# Patient Record
Sex: FEMALE | Race: WHITE | NOT HISPANIC OR LATINO | Employment: UNEMPLOYED | URBAN - METROPOLITAN AREA
[De-identification: names, ages, dates, MRNs, and addresses within clinical notes are randomized per-mention and may not be internally consistent; named-entity substitution may affect disease eponyms.]

---

## 2020-05-13 ENCOUNTER — TELEPHONE (OUTPATIENT)
Dept: OBGYN | Age: 25
End: 2020-05-13

## 2020-05-13 DIAGNOSIS — Z34.01 ENCOUNTER FOR PRENATAL CARE IN FIRST TRIMESTER OF FIRST PREGNANCY: Primary | ICD-10-CM

## 2020-06-12 ENCOUNTER — LAB SERVICES (OUTPATIENT)
Dept: LAB | Age: 25
End: 2020-06-12

## 2020-06-12 ENCOUNTER — FIRST OB VISIT (OUTPATIENT)
Dept: OBGYN | Age: 25
End: 2020-06-12

## 2020-06-12 VITALS
WEIGHT: 129.4 LBS | SYSTOLIC BLOOD PRESSURE: 98 MMHG | HEIGHT: 64 IN | DIASTOLIC BLOOD PRESSURE: 62 MMHG | BODY MASS INDEX: 22.09 KG/M2 | HEART RATE: 72 BPM

## 2020-06-12 DIAGNOSIS — Z34.01 ENCOUNTER FOR SUPERVISION OF NORMAL FIRST PREGNANCY IN FIRST TRIMESTER: Primary | ICD-10-CM

## 2020-06-12 DIAGNOSIS — Z34.01 ENCOUNTER FOR SUPERVISION OF NORMAL FIRST PREGNANCY IN FIRST TRIMESTER: ICD-10-CM

## 2020-06-12 DIAGNOSIS — Z12.4 SCREENING FOR CERVICAL CANCER: ICD-10-CM

## 2020-06-12 DIAGNOSIS — Z11.3 SCREENING FOR STDS (SEXUALLY TRANSMITTED DISEASES): ICD-10-CM

## 2020-06-12 LAB
ABO + RH BLD: NORMAL
APPEARANCE UR: CLEAR
BILIRUB UR QL STRIP: NEGATIVE
BLD GP AB SCN SERPL QL GEL: NEGATIVE
CERVICAL LENGTH: 3.58 CM
COLOR UR: YELLOW
CRL: 29.2 MM
DEPRECATED RDW RBC: 40.8 FL (ref 39–50)
DEPTH (R): 2.83 CM
EDD: NORMAL
ERYTHROCYTE [DISTWIDTH] IN BLOOD: 12.7 % (ref 11–15)
EXTERNAL SYPHILIS RPR SCREEN: NORMAL
GA: NORMAL WEEKS/DAYS
GLUCOSE UR STRIP-MCNC: NEGATIVE MG/DL
GS: 46.4 MM
HBV SURFACE AG SER QL: NEGATIVE
HCT VFR BLD CALC: 41.9 % (ref 36–46.5)
HGB BLD-MCNC: 14.7 G/DL (ref 12–15.5)
HGB UR QL STRIP: NEGATIVE
HR: 167 BPM
KETONES UR STRIP-MCNC: NEGATIVE MG/DL
LENGTH (L): 2.96 CM
LENGTH (R): 3.37 CM
LEUKOCYTE ESTERASE UR QL STRIP: NEGATIVE
MCH RBC QN AUTO: 31.3 PG (ref 26–34)
MCHC RBC AUTO-ENTMCNC: 35.1 G/DL (ref 32–36.5)
MCV RBC AUTO: 89.1 FL (ref 78–100)
NITRITE UR QL STRIP: NEGATIVE
PH UR STRIP: 6 [PH] (ref 5–7)
PLATELET # BLD AUTO: 254 K/MCL (ref 140–450)
PROT UR STRIP-MCNC: NEGATIVE MG/DL
RBC # BLD: 4.7 MIL/MCL (ref 4–5.2)
RUBV IGG SERPL IA-ACNC: 48 UNITS/ML
SP GR UR STRIP: 1.01 (ref 1–1.03)
UROBILINOGEN UR STRIP-MCNC: 0.2 MG/DL
UTERINE DEPTH: 8.5 CM
UTERINE LENGTH: 8.51 CM
UTERINE WIDTH: 5.59 CM
WBC # BLD: 8.2 K/MCL (ref 4.2–11)
WIDTH (L): 1.96 CM
WIDTH (R): 1.37 CM

## 2020-06-12 PROCEDURE — 87591 N.GONORRHOEAE DNA AMP PROB: CPT | Performed by: INTERNAL MEDICINE

## 2020-06-12 PROCEDURE — 87086 URINE CULTURE/COLONY COUNT: CPT | Performed by: INTERNAL MEDICINE

## 2020-06-12 PROCEDURE — 99204 OFFICE O/P NEW MOD 45 MIN: CPT | Performed by: OBSTETRICS & GYNECOLOGY

## 2020-06-12 PROCEDURE — 86762 RUBELLA ANTIBODY: CPT | Performed by: INTERNAL MEDICINE

## 2020-06-12 PROCEDURE — 87340 HEPATITIS B SURFACE AG IA: CPT | Performed by: INTERNAL MEDICINE

## 2020-06-12 PROCEDURE — 86900 BLOOD TYPING SEROLOGIC ABO: CPT | Performed by: INTERNAL MEDICINE

## 2020-06-12 PROCEDURE — 86780 TREPONEMA PALLIDUM: CPT | Performed by: INTERNAL MEDICINE

## 2020-06-12 PROCEDURE — 86901 BLOOD TYPING SEROLOGIC RH(D): CPT | Performed by: INTERNAL MEDICINE

## 2020-06-12 PROCEDURE — 86850 RBC ANTIBODY SCREEN: CPT | Performed by: INTERNAL MEDICINE

## 2020-06-12 PROCEDURE — 81003 URINALYSIS AUTO W/O SCOPE: CPT | Performed by: INTERNAL MEDICINE

## 2020-06-12 PROCEDURE — 76817 TRANSVAGINAL US OBSTETRIC: CPT | Performed by: OBSTETRICS & GYNECOLOGY

## 2020-06-12 PROCEDURE — 85027 COMPLETE CBC AUTOMATED: CPT | Performed by: INTERNAL MEDICINE

## 2020-06-12 PROCEDURE — 88175 CYTOPATH C/V AUTO FLUID REDO: CPT | Performed by: PATHOLOGY

## 2020-06-12 PROCEDURE — 87389 HIV-1 AG W/HIV-1&-2 AB AG IA: CPT | Performed by: INTERNAL MEDICINE

## 2020-06-12 PROCEDURE — 87491 CHLMYD TRACH DNA AMP PROBE: CPT | Performed by: INTERNAL MEDICINE

## 2020-06-12 ASSESSMENT — EDINBURGH POSTNATAL DEPRESSION SCALE (EPDS)
I HAVE BEEN SO UNHAPPY THAT I HAVE HAD DIFFICULTY SLEEPING: NOT AT ALL
I HAVE BEEN SO UNHAPPY THAT I HAVE BEEN CRYING: NO, NEVER
I HAVE BEEN ABLE TO LAUGH AND SEE THE FUNNY SIDE OF THINGS: AS MUCH AS I ALWAYS COULD
I HAVE LOOKED FORWARD WITH ENJOYMENT TO THINGS: AS MUCH AS I EVER DID
I HAVE FELT SCARED OR PANICKY FOR NO GOOD REASON: NO, NOT AT ALL
I HAVE BLAMED MYSELF UNNECESSARILY WHEN THINGS WENT WRONG: NO, NEVER
THINGS HAVE BEEN GETTING ON TOP OF ME: NO, I HAVE BEEN COPING AS WELL AS EVER
THE THOUGHT OF HARMING MYSELF HAS OCCURRED TO ME: NEVER
TOTAL SCORE: 1
I HAVE FELT SAD OR MISERABLE: NO, NOT AT ALL
I HAVE BEEN ANXIOUS OR WORRIED FOR NO GOOD REASON: HARDLY EVER

## 2020-06-13 LAB
BACTERIA UR CULT: NORMAL
HIV 1+2 AB+HIV1 P24 AG SERPL QL IA: NONREACTIVE
T PALLIDUM IGG SER QL IA: NONREACTIVE

## 2020-06-15 LAB
C TRACH RRNA SPEC QL NAA+PROBE: NEGATIVE
Lab: NORMAL
N GONORRHOEA RRNA SPEC QL NAA+PROBE: NEGATIVE

## 2020-06-17 LAB
CASE RPRT: NORMAL
CYTOLOGY CVX/VAG STUDY: NORMAL
PAP EDUCATIONAL NOTE: NORMAL
SPECIMEN ADEQUACY: NORMAL

## 2020-06-18 ENCOUNTER — TELEPHONE (OUTPATIENT)
Dept: OBGYN | Age: 25
End: 2020-06-18

## 2020-07-06 ENCOUNTER — OB CHECK (OUTPATIENT)
Dept: OBGYN | Age: 25
End: 2020-07-06

## 2020-07-06 VITALS
WEIGHT: 129.4 LBS | HEART RATE: 78 BPM | DIASTOLIC BLOOD PRESSURE: 68 MMHG | SYSTOLIC BLOOD PRESSURE: 102 MMHG | BODY MASS INDEX: 22.21 KG/M2

## 2020-07-06 DIAGNOSIS — Z34.02 ENCOUNTER FOR SUPERVISION OF NORMAL FIRST PREGNANCY IN SECOND TRIMESTER: Primary | ICD-10-CM

## 2020-07-06 PROCEDURE — 99212 OFFICE O/P EST SF 10 MIN: CPT | Performed by: OBSTETRICS & GYNECOLOGY

## 2020-07-07 ENCOUNTER — TELEPHONE (OUTPATIENT)
Dept: OBGYN | Age: 25
End: 2020-07-07

## 2020-07-08 LAB — HOLD SPECIMEN: NORMAL

## 2020-12-17 LAB — EXTERNAL GROUP B STREP DNA: NEGATIVE

## 2020-12-28 ENCOUNTER — TELEPHONE (OUTPATIENT)
Dept: OBSTETRICS AND GYNECOLOGY | Facility: HOSPITAL | Age: 25
End: 2020-12-28

## 2020-12-29 ENCOUNTER — HOSPITAL ENCOUNTER (OUTPATIENT)
Facility: HOSPITAL | Age: 25
Discharge: HOME | End: 2020-12-29
Attending: OBSTETRICS & GYNECOLOGY | Admitting: OBSTETRICS & GYNECOLOGY
Payer: COMMERCIAL

## 2020-12-29 ENCOUNTER — ANESTHESIA EVENT (OUTPATIENT)
Dept: OBSTETRICS AND GYNECOLOGY | Facility: HOSPITAL | Age: 25
End: 2020-12-29
Payer: COMMERCIAL

## 2020-12-29 ENCOUNTER — APPOINTMENT (OUTPATIENT)
Dept: LABOR AND DELIVERY | Facility: HOSPITAL | Age: 25
End: 2020-12-29
Payer: COMMERCIAL

## 2020-12-29 ENCOUNTER — ANESTHESIA (OUTPATIENT)
Dept: OBSTETRICS AND GYNECOLOGY | Facility: HOSPITAL | Age: 25
End: 2020-12-29
Payer: COMMERCIAL

## 2020-12-29 VITALS
HEIGHT: 64 IN | HEART RATE: 72 BPM | DIASTOLIC BLOOD PRESSURE: 61 MMHG | WEIGHT: 158.95 LBS | RESPIRATION RATE: 18 BRPM | BODY MASS INDEX: 27.14 KG/M2 | OXYGEN SATURATION: 100 % | SYSTOLIC BLOOD PRESSURE: 105 MMHG | TEMPERATURE: 97.9 F

## 2020-12-29 LAB
PLATELET # BLD AUTO: 249 10*3/UL (ref 140–350)
SARS-COV-2 RNA RESP QL NAA+PROBE: NEGATIVE

## 2020-12-29 PROCEDURE — 87635 SARS-COV-2 COVID-19 AMP PRB: CPT | Performed by: OBSTETRICS & GYNECOLOGY

## 2020-12-29 PROCEDURE — G0463 HOSPITAL OUTPT CLINIC VISIT: HCPCS

## 2020-12-29 PROCEDURE — 85049 AUTOMATED PLATELET COUNT: CPT | Performed by: OBSTETRICS & GYNECOLOGY

## 2020-12-29 PROCEDURE — C1755 CATHETER, INTRASPINAL: HCPCS | Performed by: ANESTHESIOLOGY

## 2020-12-29 PROCEDURE — C9803 HOPD COVID-19 SPEC COLLECT: HCPCS | Performed by: OBSTETRICS & GYNECOLOGY

## 2020-12-29 PROCEDURE — 6360000200 HC RX 636 W HCPCS (ALT 250 FOR IP): Performed by: OBSTETRICS & GYNECOLOGY

## 2020-12-29 PROCEDURE — C1755 CATHETER, INTRASPINAL: HCPCS | Performed by: NURSE ANESTHETIST, CERTIFIED REGISTERED

## 2020-12-29 PROCEDURE — 96372 THER/PROPH/DIAG INJ SC/IM: CPT

## 2020-12-29 PROCEDURE — 2500000200 HC RX 250 WO HCPCS: Performed by: ANESTHESIOLOGY

## 2020-12-29 PROCEDURE — 62326 NJX INTERLAMINAR LMBR/SAC: CPT

## 2020-12-29 PROCEDURE — 6360000200 HC RX 636 W HCPCS (ALT 250 FOR IP): Performed by: ANESTHESIOLOGY

## 2020-12-29 PROCEDURE — 2580000300 HC RX 258: Performed by: ANESTHESIOLOGY

## 2020-12-29 PROCEDURE — 96374 THER/PROPH/DIAG INJ IV PUSH: CPT

## 2020-12-29 RX ORDER — LIDOCAINE HYDROCHLORIDE AND EPINEPHRINE 15; 5 MG/ML; UG/ML
INJECTION, SOLUTION EPIDURAL AS NEEDED
Status: DISCONTINUED | OUTPATIENT
Start: 2020-12-29 | End: 2021-01-04 | Stop reason: SURG

## 2020-12-29 RX ORDER — FENTANYL CITRATE/PF 50 MCG/ML
PLASTIC BAG, INJECTION (ML) INTRAVENOUS AS NEEDED
Status: DISCONTINUED | OUTPATIENT
Start: 2020-12-29 | End: 2021-01-04 | Stop reason: SURG

## 2020-12-29 RX ORDER — DIPHENHYDRAMINE HYDROCHLORIDE 50 MG/ML
25 INJECTION INTRAMUSCULAR; INTRAVENOUS EVERY 6 HOURS PRN
Status: DISCONTINUED | OUTPATIENT
Start: 2020-12-29 | End: 2020-12-29 | Stop reason: HOSPADM

## 2020-12-29 RX ORDER — FENTANYL/ROPIVACAINE/NS/PF 2MCG/ML-.2
PLASTIC BAG, INJECTION (ML) INJECTION CONTINUOUS
Status: DISCONTINUED | OUTPATIENT
Start: 2020-12-29 | End: 2020-12-29 | Stop reason: HOSPADM

## 2020-12-29 RX ORDER — SODIUM CHLORIDE, SODIUM LACTATE, POTASSIUM CHLORIDE, AND CALCIUM CHLORIDE .6; .31; .03; .02 G/100ML; G/100ML; G/100ML; G/100ML
1000 INJECTION, SOLUTION INTRAVENOUS ONCE
Status: COMPLETED | OUTPATIENT
Start: 2020-12-29 | End: 2020-12-29

## 2020-12-29 RX ORDER — TERBUTALINE SULFATE 1 MG/ML
0.25 INJECTION SUBCUTANEOUS ONCE
Status: COMPLETED | OUTPATIENT
Start: 2020-12-29 | End: 2020-12-29

## 2020-12-29 RX ORDER — FOLIC ACID/MULTIVIT,IRON,MINER 0.4MG-18MG
1 TABLET ORAL DAILY
COMMUNITY

## 2020-12-29 RX ORDER — FENTANYL CITRATE/PF 50 MCG/ML
100 PLASTIC BAG, INJECTION (ML) INTRAVENOUS ONCE
Status: COMPLETED | OUTPATIENT
Start: 2020-12-29 | End: 2020-12-29

## 2020-12-29 RX ORDER — ONDANSETRON HYDROCHLORIDE 2 MG/ML
4 INJECTION, SOLUTION INTRAVENOUS ONCE
Status: COMPLETED | OUTPATIENT
Start: 2020-12-29 | End: 2020-12-29

## 2020-12-29 RX ORDER — NALOXONE HYDROCHLORIDE 0.4 MG/ML
0.2 INJECTION, SOLUTION INTRAMUSCULAR; INTRAVENOUS; SUBCUTANEOUS AS NEEDED
Status: DISCONTINUED | OUTPATIENT
Start: 2020-12-29 | End: 2020-12-29 | Stop reason: HOSPADM

## 2020-12-29 RX ADMIN — FENTANYL CITRATE 100 MCG: 50 INJECTION INTRAMUSCULAR; INTRAVENOUS at 08:16

## 2020-12-29 RX ADMIN — FENTANYL CITRATE 100 MCG: 50 INJECTION, SOLUTION INTRAMUSCULAR; INTRAVENOUS at 07:35

## 2020-12-29 RX ADMIN — Medication: at 08:23

## 2020-12-29 RX ADMIN — LIDOCAINE HYDROCHLORIDE AND EPINEPHRINE 5 ML: 15; 5 INJECTION, SOLUTION EPIDURAL at 08:18

## 2020-12-29 RX ADMIN — ONDANSETRON 4 MG: 2 INJECTION INTRAMUSCULAR; INTRAVENOUS at 07:35

## 2020-12-29 RX ADMIN — TERBUTALINE SULFATE 0.25 MG: 1 INJECTION SUBCUTANEOUS at 08:51

## 2020-12-29 RX ADMIN — SODIUM CHLORIDE, POTASSIUM CHLORIDE, SODIUM LACTATE AND CALCIUM CHLORIDE 1000 ML: 600; 310; 30; 20 INJECTION, SOLUTION INTRAVENOUS at 08:25

## 2020-12-29 NOTE — H&P
History and Physical    Chief complaint: Version    HPI  Magy Marin is a 25 y.o. female with an Estimated Date of Delivery: 21. She is currently a 38w4d  who is being admitted for External version. Her current pregnancy is significant for breech presentation. Patient reports no complaints. Fetal Movement: normal.     OB History    Para Term  AB Living   1             SAB TAB Ectopic Molar Multiple Live Births                    # Outcome Date GA Lbr Jeff/2nd Weight Sex Delivery Anes PTL Lv   1 Current              History reviewed. No pertinent past medical history.  History reviewed. No pertinent surgical history.    History reviewed. No pertinent family history.  No Known Allergies  Medications Prior to Admission   Medication Sig   • MVP-ferrous fumarate-FA (Prenatal) 28 mg iron- 800 mcg tablet Take 1 tablet by mouth daily       Review of Systems:  Pertinent items are noted in HPI.    Objective     Vital signs:  Vitals:    20 0845   BP: 108/75   Pulse: 68   Resp:    Temp:    SpO2: 100%       Maternal Type & Screen     Maternal Type & Screen (External Results)     Test Value Date Time    ABO (External Results) B  20     Rh (External Results) Positive  20     Antibody screen (External Results) Negative  20             Prenatal Results     Maternal Prenatal Labs     Test Value Date Time    Gonorrhea  Negative  20     Chlamydia  Negative  20     Syphilis Ab Non-reactive  20     RPR        Rubella Reactive-Presumed Immune  20     HBsAg  Nonreactive  20     HIV Nonreactive  20     GBS       GBS Report                 Physical Exam:  General:   Alert, oriented, no acute distress   Skin: Normal   Lungs:  Clear to auscultation bilaterally   Heart:  Regular rate and rhythm   Abdomen:  Gravid, non-tender   Extremities:  No edema, non-tender   Pelvis/Cervix:  deferred   Uterine Size:  size equals dates US confirms transverse lie, head on  maternal left     EFM: Low baseline. 100s with good variability  Delevan: acontractile    Assessment/Plan      Active Problems:  No Active Problems: There are no active problems currently on the Problem List. Please update the Problem List and refresh.    38 4/7 weeks. Breech/transverse lie. Plan is for attempted external version. Pt consented about further management pending outcome. Consented for emergent section if necessity dictates.  20 minutes  _____________________________  Irene Louie MD  12/29/20 9:12 AM

## 2020-12-29 NOTE — ANESTHESIA PROCEDURE NOTES
Epidural Block    Patient location during procedure: OB  Start time: 12/29/2020 8:17 AM  End time: 12/29/2020 8:17 AM    Staffing  Anesthesiologist: YURY Thakur MD  Performed: anesthesiologist   Preanesthetic Checklist  Completed: patient identified, IV checked, site marked, risks and benefits discussed, surgical consent, monitors and equipment checked, pre-op evaluation and timeout performed  Epidural  Patient position: sitting  Prep: Betadine  Patient monitoring: blood pressure monitoring and continuous pulse oximetry  Approach: midline  Location: L3-L4  Injection technique: DARIUSZ saline  Procedure: negative aspiration for heme,  no paresthesia on injection and negative aspiration for CSF  Local infiltration: lidocaine 1%  Needle  Needle type: Tuohy   Needle gauge: 17 G  Needle insertion depth: 5 cm  Catheter type: side hole  Catheter size: 20 G  Catheter at skin depth: 11 cm  Test dose: negative and lidocaine 1.5% with epinephrine 1-to-200,000  Test dose volume: 3 mL  Catheter secured with: tape and Neoderm    Assessment  Sensory level: T10  Events: well tolerated

## 2020-12-29 NOTE — ANESTHESIA PREPROCEDURE EVALUATION
"Pre-Procedure Assessment    Patient: Magy Marin, female, 25 y.o.    Ht Readings from Last 1 Encounters:   12/29/20 1.626 m (5' 4\")     Wt Readings from Last 1 Encounters:   12/29/20 72.1 kg (158 lb 15.2 oz)       Last Vitals  /78 (12/29/20 0708)    Temp 36.6 °C (97.9 °F) (12/29/20 0708)    Pulse 109 (12/29/20 0710)   Resp 18 (12/29/20 0708)    SpO2 100 % (12/29/20 0710)    Pain Score 0 - No pain (12/29/20 0708)       Problem list reviewed and Medical history reviewed           Airway   Mallampati: II  TM distance: >3 FB  Neck ROM: full      Dental      Pulmonary - negative ROS    breath sounds clear to auscultation  Cardiovascular - negative ROS    Rhythm: regular  Rate: normal    Mental Status/Neuro/Psych - negative ROS   Pt is alert.        GI/Hepatic/Renal - negative ROS     Endo/Other - negative ROS   Abdominal           Social History     Tobacco Use   • Smoking status: Never Smoker   • Smokeless tobacco: Never Used   Substance Use Topics   • Alcohol use: Not Currently      Hematology   Platelets   Date Value Ref Range Status   12/29/2020 249 140 - 350 10*3/uL Final      Coagulation No results found for: PT, APTT, INR   General Chemistry No results found for: POCGLU, CALCIUM, BUN, CREATININE, GLUCOSE, NA, K, MG, CO2, CL, DIGOXIN  Anesthesia Plan    ASA 2   NPO status reviewed: N/A    Spinal                          Anesthetic plan and risks discussed with patient.  Use of blood products discussed with patient who consented to blood products.             "

## 2020-12-29 NOTE — ANESTHESIA PROCEDURE NOTES
Epidural Block    Patient location during procedure: OB  Start time: 12/29/2020 8:06 AM  End time: 12/29/2020 8:16 AM    Staffing  Anesthesiologist: YURY Thakur MD  Performed: anesthesiologist   Preanesthetic Checklist  Completed: patient identified, IV checked, site marked, risks and benefits discussed, surgical consent, monitors and equipment checked, pre-op evaluation and timeout performed  Epidural  Patient position: sitting  Prep: Betadine  Patient monitoring: blood pressure monitoring and continuous pulse oximetry  Approach: midline  Location: L3-L4  Injection technique: DARIUSZ saline  Procedure: negative aspiration for heme,  no paresthesia on injection and negative aspiration for CSF  Local infiltration: lidocaine 1%  Needle  Needle type: Tuohy   Needle gauge: 17 G  Needle insertion depth: 5 cm  Catheter type: side hole  Catheter size: 20 G  Catheter at skin depth: 11 cm  Test dose: negative and lidocaine 1.5% with epinephrine 1-to-200,000  Test dose volume: 3 mL  Catheter secured with: tape and Neoderm    Assessment  Sensory level: T10  Events: well tolerated

## 2020-12-29 NOTE — OP NOTE
Brief Op Note:    Magy Marin  1/4/[birth year]21    * No Diagnosis Codes entered *Breecn       * No Diagnosis Codes entered *    * No procedures documented on diagnosis form *Attempted external version    Surgeon(s):  Irene Louie MD    Asst: Margaret Birmingham, PGY3      Anesthesia:  Anesthesiologist: (Unknown)  CRNA: (Unknown)  Spinal      Pain Block:  Not requested    Staff:   Relief Circulator: (Unknown)  Scrub Person: (Unknown)    Estimated Blood Loss:  No blood loss documented.    Specimens:  No specimens collected during this procedure.      Complications:  None    Procedure: After epidural anesthesia placed, pt consented for external version. US confirms transverse lie, head on maternal left. Terb given prior to attempting version. Attempt to roll baby's head into pelvis attempted but unsuccessful. Pt had some bruising on abdomen as a result of the attempt. Pt appropriately counseled for primary section for breech the following week, labor precautions reviewed. Baby tolerated procedure well. Pt to remain for minimum of two hours for extended monitoring. Epidural turned off at completion of procedure.  Irene Louie MD  Phone Number: [phone]    Date: 12/29/2020  Time: 9:15 AM

## 2020-12-30 NOTE — PRE-PROCEDURE INSTRUCTIONS
Pre-Surgery Instructions:   Medication Instructions   • MVP-ferrous fumarate-FA (Prenatal) 28 mg iron- 800 mcg tablet Do not take morning of surgery/procedure

## 2021-01-04 ENCOUNTER — HOSPITAL ENCOUNTER (INPATIENT)
Facility: HOSPITAL | Age: 26
LOS: 2 days | Discharge: HOME | End: 2021-01-06
Attending: OBSTETRICS & GYNECOLOGY | Admitting: OBSTETRICS & GYNECOLOGY
Payer: COMMERCIAL

## 2021-01-04 LAB
ABO GROUP (TYPE) IN BLOOD: NORMAL
ANTIBODY SCREEN: NORMAL
D AG BLD QL: NORMAL
ERYTHROCYTE [DISTWIDTH] IN BLOOD BY AUTOMATED COUNT: 15.1 % (ref 11.5–14)
HCT VFR BLD AUTO: 32.5 % (ref 34–45)
HGB BLD-MCNC: 11 G/DL (ref 11.5–15.5)
MCH RBC QN AUTO: 28 PG (ref 28–33)
MCHC RBC AUTO-ENTMCNC: 34 G/DL (ref 32–36)
MCV RBC AUTO: 82.4 FL (ref 81–97)
PLATELET # BLD AUTO: 235 10*3/UL (ref 140–350)
PMV BLD AUTO: 9.6 FL (ref 6.9–10.8)
RBC # BLD AUTO: 3.94 10*6/ΜL (ref 3.7–5.3)
SECOND/CONFIRMATORY ABORH PERFORMED: NORMAL
T PALLIDUM IGG SER QL IA: NORMAL
WBC # BLD AUTO: 10.4 10*3/UL (ref 4.5–10.5)

## 2021-01-04 PROCEDURE — 85027 COMPLETE CBC AUTOMATED: CPT | Performed by: OBSTETRICS & GYNECOLOGY

## 2021-01-04 PROCEDURE — (BLANK) HC RECOVERY PHASE-1 OB 1ST HOUR ACUITY LEVEL 1: Performed by: OBSTETRICS & GYNECOLOGY

## 2021-01-04 PROCEDURE — (BLANK) HC REGIONAL ANESTHESIA FACILITY CHARGE 1ST 15 MIN: Performed by: OBSTETRICS & GYNECOLOGY

## 2021-01-04 PROCEDURE — (BLANK) HC ROOM PRIVATE OBSTETRICS

## 2021-01-04 PROCEDURE — 6370000100 HC RX 637 (ALT 250 FOR IP): Performed by: ANESTHESIOLOGY

## 2021-01-04 PROCEDURE — 2580000300 HC RX 258: Performed by: OBSTETRICS & GYNECOLOGY

## 2021-01-04 PROCEDURE — 6360000200 HC RX 636 W HCPCS (ALT 250 FOR IP): Performed by: ANESTHESIOLOGY

## 2021-01-04 PROCEDURE — 2580000300 HC RX 258: Performed by: ANESTHESIOLOGY

## 2021-01-04 PROCEDURE — 86885 COOMBS TEST INDIRECT QUAL: CPT

## 2021-01-04 PROCEDURE — 2590000100 HC RX 259: Performed by: OBSTETRICS & GYNECOLOGY

## 2021-01-04 PROCEDURE — 6360000200 HC RX 636 W HCPCS (ALT 250 FOR IP)

## 2021-01-04 PROCEDURE — 59514 CESAREAN DELIVERY ONLY: CPT | Performed by: OBSTETRICS & GYNECOLOGY

## 2021-01-04 PROCEDURE — S0028 INJECTION, FAMOTIDINE, 20 MG: HCPCS | Performed by: ANESTHESIOLOGY

## 2021-01-04 PROCEDURE — (BLANK) HC RECOVERY PHASE-1 OB EACH ADDITIONAL 1/2  HOUR ACUITY LEVEL 1: Performed by: OBSTETRICS & GYNECOLOGY

## 2021-01-04 PROCEDURE — 2580000300 HC RX 258

## 2021-01-04 PROCEDURE — (BLANK) HC REGIONAL ANESTHESIA FACILITY CHARGE EACH ADDITIONAL MIN: Performed by: OBSTETRICS & GYNECOLOGY

## 2021-01-04 PROCEDURE — 86780 TREPONEMA PALLIDUM: CPT | Performed by: OBSTETRICS & GYNECOLOGY

## 2021-01-04 PROCEDURE — 6360000200 HC RX 636 W HCPCS (ALT 250 FOR IP): Performed by: OBSTETRICS & GYNECOLOGY

## 2021-01-04 RX ORDER — DIPHENHYDRAMINE HYDROCHLORIDE 50 MG/ML
25-50 INJECTION INTRAMUSCULAR; INTRAVENOUS EVERY 6 HOURS PRN
Status: DISCONTINUED | OUTPATIENT
Start: 2021-01-05 | End: 2021-01-06 | Stop reason: HOSPADM

## 2021-01-04 RX ORDER — KETOROLAC TROMETHAMINE 30 MG/ML
30 INJECTION, SOLUTION INTRAMUSCULAR; INTRAVENOUS EVERY 8 HOURS
Status: COMPLETED | OUTPATIENT
Start: 2021-01-04 | End: 2021-01-05

## 2021-01-04 RX ORDER — DIPHENHYDRAMINE HCL 25 MG
25-50 CAPSULE ORAL EVERY 6 HOURS PRN
Status: DISCONTINUED | OUTPATIENT
Start: 2021-01-04 | End: 2021-01-06 | Stop reason: HOSPADM

## 2021-01-04 RX ORDER — SODIUM CITRATE AND CITRIC ACID MONOHYDRATE 334; 500 MG/5ML; MG/5ML
30 SOLUTION ORAL ONCE
Status: COMPLETED | OUTPATIENT
Start: 2021-01-04 | End: 2021-01-04

## 2021-01-04 RX ORDER — HYDROCODONE BITARTRATE AND ACETAMINOPHEN 5; 325 MG/1; MG/1
2 TABLET ORAL ONCE AS NEEDED
Status: DISCONTINUED | OUTPATIENT
Start: 2021-01-04 | End: 2021-01-06 | Stop reason: HOSPADM

## 2021-01-04 RX ORDER — FAMOTIDINE 10 MG/ML
20 INJECTION INTRAVENOUS ONCE
Status: COMPLETED | OUTPATIENT
Start: 2021-01-04 | End: 2021-01-04

## 2021-01-04 RX ORDER — ONDANSETRON HYDROCHLORIDE 2 MG/ML
4 INJECTION, SOLUTION INTRAVENOUS EVERY 6 HOURS PRN
Status: DISCONTINUED | OUTPATIENT
Start: 2021-01-05 | End: 2021-01-06 | Stop reason: HOSPADM

## 2021-01-04 RX ORDER — SODIUM CHLORIDE, SODIUM LACTATE, POTASSIUM CHLORIDE, CALCIUM CHLORIDE 600; 310; 30; 20 MG/100ML; MG/100ML; MG/100ML; MG/100ML
125 INJECTION, SOLUTION INTRAVENOUS CONTINUOUS
Status: DISCONTINUED | OUTPATIENT
Start: 2021-01-04 | End: 2021-01-06 | Stop reason: HOSPADM

## 2021-01-04 RX ORDER — DOCUSATE SODIUM 100 MG/1
100 CAPSULE, LIQUID FILLED ORAL 2 TIMES DAILY
Status: DISCONTINUED | OUTPATIENT
Start: 2021-01-04 | End: 2021-01-06 | Stop reason: HOSPADM

## 2021-01-04 RX ORDER — AMMONIA 15 % (W/V)
1 AMPUL (EA) INHALATION AS NEEDED
Status: DISCONTINUED | OUTPATIENT
Start: 2021-01-04 | End: 2021-01-06 | Stop reason: HOSPADM

## 2021-01-04 RX ORDER — OXYCODONE HYDROCHLORIDE 5 MG/1
10 TABLET ORAL EVERY 4 HOURS PRN
Status: ACTIVE | OUTPATIENT
Start: 2021-01-04 | End: 2021-01-05

## 2021-01-04 RX ORDER — BUPIVACAINE HYDROCHLORIDE 7.5 MG/ML
INJECTION, SOLUTION INTRASPINAL
Status: DISCONTINUED | OUTPATIENT
Start: 2021-01-04 | End: 2021-01-04 | Stop reason: SURG

## 2021-01-04 RX ORDER — DIPHENHYDRAMINE HYDROCHLORIDE 50 MG/ML
25 INJECTION INTRAMUSCULAR; INTRAVENOUS EVERY 6 HOURS PRN
Status: ACTIVE | OUTPATIENT
Start: 2021-01-04 | End: 2021-01-05

## 2021-01-04 RX ORDER — KETOROLAC TROMETHAMINE 30 MG/ML
INJECTION, SOLUTION INTRAMUSCULAR; INTRAVENOUS AS NEEDED
Status: DISCONTINUED | OUTPATIENT
Start: 2021-01-04 | End: 2021-01-04 | Stop reason: SURG

## 2021-01-04 RX ORDER — SODIUM CHLORIDE, SODIUM LACTATE, POTASSIUM CHLORIDE, CALCIUM CHLORIDE 600; 310; 30; 20 MG/100ML; MG/100ML; MG/100ML; MG/100ML
250 INJECTION, SOLUTION INTRAVENOUS CONTINUOUS
Status: DISCONTINUED | OUTPATIENT
Start: 2021-01-04 | End: 2021-01-06 | Stop reason: HOSPADM

## 2021-01-04 RX ORDER — METOCLOPRAMIDE HYDROCHLORIDE 5 MG/ML
10 INJECTION INTRAMUSCULAR; INTRAVENOUS ONCE
Status: COMPLETED | OUTPATIENT
Start: 2021-01-04 | End: 2021-01-04

## 2021-01-04 RX ORDER — ONDANSETRON HYDROCHLORIDE 2 MG/ML
4 INJECTION, SOLUTION INTRAVENOUS EVERY 4 HOURS PRN
Status: ACTIVE | OUTPATIENT
Start: 2021-01-04 | End: 2021-01-05

## 2021-01-04 RX ORDER — MORPHINE SULFATE 1 MG/ML
INJECTION, SOLUTION EPIDURAL; INTRATHECAL; INTRAVENOUS
Status: DISCONTINUED | OUTPATIENT
Start: 2021-01-04 | End: 2021-01-04 | Stop reason: SURG

## 2021-01-04 RX ORDER — OXYTOCIN/0.9 % SODIUM CHLORIDE 30/500 ML
250 PLASTIC BAG, INJECTION (ML) INTRAVENOUS CONTINUOUS
Status: ACTIVE | OUTPATIENT
Start: 2021-01-04 | End: 2021-01-04

## 2021-01-04 RX ORDER — HYDROMORPHONE HYDROCHLORIDE 1 MG/ML
0.2 INJECTION, SOLUTION INTRAMUSCULAR; INTRAVENOUS; SUBCUTANEOUS
Status: ACTIVE | OUTPATIENT
Start: 2021-01-04 | End: 2021-01-05

## 2021-01-04 RX ORDER — KETOROLAC TROMETHAMINE 30 MG/ML
30 INJECTION, SOLUTION INTRAMUSCULAR; INTRAVENOUS EVERY 6 HOURS
Status: DISCONTINUED | OUTPATIENT
Start: 2021-01-04 | End: 2021-01-04

## 2021-01-04 RX ORDER — HYDROCODONE BITARTRATE AND ACETAMINOPHEN 5; 325 MG/1; MG/1
1 TABLET ORAL EVERY 4 HOURS PRN
Status: DISCONTINUED | OUTPATIENT
Start: 2021-01-05 | End: 2021-01-06 | Stop reason: HOSPADM

## 2021-01-04 RX ORDER — SIMETHICONE 80 MG
80 TABLET,CHEWABLE ORAL EVERY 4 HOURS PRN
Status: DISCONTINUED | OUTPATIENT
Start: 2021-01-04 | End: 2021-01-06 | Stop reason: HOSPADM

## 2021-01-04 RX ORDER — CEFAZOLIN SODIUM 10 G/1
2000 INJECTION, POWDER, FOR SOLUTION INTRAVENOUS ONCE
Status: COMPLETED | OUTPATIENT
Start: 2021-01-04 | End: 2021-01-04

## 2021-01-04 RX ORDER — IBUPROFEN 800 MG/1
800 TABLET ORAL EVERY 6 HOURS PRN
Status: DISCONTINUED | OUTPATIENT
Start: 2021-01-05 | End: 2021-01-06 | Stop reason: HOSPADM

## 2021-01-04 RX ORDER — OXYCODONE HYDROCHLORIDE 5 MG/1
5 TABLET ORAL EVERY 4 HOURS PRN
Status: DISPENSED | OUTPATIENT
Start: 2021-01-04 | End: 2021-01-05

## 2021-01-04 RX ORDER — SODIUM CHLORIDE 9 MG/ML
500 INJECTION, SOLUTION INTRAVENOUS ONCE AS NEEDED
Status: DISCONTINUED | OUTPATIENT
Start: 2021-01-04 | End: 2021-01-06 | Stop reason: HOSPADM

## 2021-01-04 RX ORDER — ACETAMINOPHEN 500 MG
1000 TABLET ORAL EVERY 6 HOURS
Status: COMPLETED | OUTPATIENT
Start: 2021-01-04 | End: 2021-01-05

## 2021-01-04 RX ORDER — SODIUM CHLORIDE, SODIUM LACTATE, POTASSIUM CHLORIDE, AND CALCIUM CHLORIDE .6; .31; .03; .02 G/100ML; G/100ML; G/100ML; G/100ML
1000 INJECTION, SOLUTION INTRAVENOUS ONCE
Status: COMPLETED | OUTPATIENT
Start: 2021-01-04 | End: 2021-01-04

## 2021-01-04 RX ADMIN — OXYCODONE HYDROCHLORIDE 5 MG: 5 TABLET ORAL at 17:04

## 2021-01-04 RX ADMIN — Medication 1000 MG: at 23:03

## 2021-01-04 RX ADMIN — Medication 1000 MG: at 17:14

## 2021-01-04 RX ADMIN — BUPIVACAINE HYDROCHLORIDE 1.4 ML: 7.5 INJECTION, SOLUTION INTRASPINAL at 13:57

## 2021-01-04 RX ADMIN — OXYCODONE HYDROCHLORIDE 5 MG: 5 TABLET ORAL at 20:52

## 2021-01-04 RX ADMIN — METOCLOPRAMIDE 10 MG: 5 INJECTION, SOLUTION INTRAMUSCULAR; INTRAVENOUS at 13:22

## 2021-01-04 RX ADMIN — KETOROLAC TROMETHAMINE 15 MG: 30 INJECTION, SOLUTION INTRAMUSCULAR at 14:44

## 2021-01-04 RX ADMIN — SODIUM CHLORIDE, POTASSIUM CHLORIDE, SODIUM LACTATE AND CALCIUM CHLORIDE 1000 ML: 600; 310; 30; 20 INJECTION, SOLUTION INTRAVENOUS at 12:35

## 2021-01-04 RX ADMIN — OXYTOCIN 500 ML/HR: 10 INJECTION, SOLUTION INTRAMUSCULAR; INTRAVENOUS at 14:09

## 2021-01-04 RX ADMIN — SODIUM CITRATE AND CITRIC ACID MONOHYDRATE 30 ML: 500; 334 SOLUTION ORAL at 13:23

## 2021-01-04 RX ADMIN — FAMOTIDINE 20 MG: 10 INJECTION, SOLUTION INTRAVENOUS at 13:22

## 2021-01-04 RX ADMIN — Medication 100 MG: at 20:52

## 2021-01-04 RX ADMIN — MORPHINE SULFATE 0.2 MG: 1 INJECTION EPIDURAL; INTRATHECAL; INTRAVENOUS at 13:57

## 2021-01-04 RX ADMIN — CEFAZOLIN 2000 MG: 10 INJECTION, POWDER, FOR SOLUTION INTRAVENOUS at 14:00

## 2021-01-04 RX ADMIN — SODIUM CHLORIDE, POTASSIUM CHLORIDE, SODIUM LACTATE AND CALCIUM CHLORIDE 250 ML/HR: 600; 310; 30; 20 INJECTION, SOLUTION INTRAVENOUS at 12:50

## 2021-01-04 RX ADMIN — SODIUM CHLORIDE, POTASSIUM CHLORIDE, SODIUM LACTATE AND CALCIUM CHLORIDE 125 ML/HR: 600; 310; 30; 20 INJECTION, SOLUTION INTRAVENOUS at 18:32

## 2021-01-04 RX ADMIN — KETOROLAC TROMETHAMINE 30 MG: 30 INJECTION, SOLUTION INTRAMUSCULAR at 23:04

## 2021-01-04 ASSESSMENT — ACTIVITIES OF DAILY LIVING (ADL)
PATIENT'S MEMORY ADEQUATE TO SAFELY COMPLETE DAILY ACTIVITIES?: YES
ADEQUATE_TO_COMPLETE_ADL: YES

## 2021-01-04 NOTE — PLAN OF CARE
Problem:  Recovery Care  Goal: Verbalizes understanding of post-op instructions  Description: INTERVENTIONS:   1. Provide post-op teaching   Outcome: Progressing  Flowsheets (Taken 2021)  Patient verbalizes understanding of post-op teaching: Provide post-op teaching     Problem:  Recovery Care  Goal: Manages discomfort  Description: INTERVENTIONS:  1. Assess and monitor for signs and symptoms of discomfort  2. Assess patient's pain level regularly and per hospital policy  3. Administer medications as ordered  4. Support use of nonpharmacological methods to help control pain such as distraction, imagery, relaxation, and application of heat and cold  5. Collaborate with interdisciplinary team and patient to determine appropriate pain management plan  6.  Include patient in decisions related to comfort   7. Report ineffective pain management to physician  Outcome: Progressing  Flowsheets (Taken 2021)  Manages discomfort:   Assess and monitor for signs and symptoms of discomfort   Assess patient's pain level regularly and per hospital policy   Administer medications as ordered   Support use of nonpharmacological methods to help control pain such as distraction, imagery, relaxation, and application of heat and cold   Collaborate with interdisciplinary team and patient to determine appropriate pain management plan   Include patient in decisions related to comfort   Report ineffective pain management to physician     Problem:  Recovery Care  Goal: Dressing intact until removed with any drainage marked  Description: INTERVENTIONS:  1. During recovery check dressing  2. Manuel any drainage on dressing   Outcome: Progressing  Flowsheets (Taken 2021)  Dressing intact during recovery with any drainage marked:   During recovery check dressing   Manuel any drainage on dressing     Problem:  Recovery Care  Goal: Patient vital signs are stable  Description:  INTERVENTIONS:  1. Assess vital signs  2. Turn, cough, and deep breathe  3. Leg exercises   Outcome: Progressing  Flowsheets (Taken 2021)  Vital signs are medically acceptable:   Assess vital signs   Turn, cough, and deep breathe   Leg exercises     Problem:  Recovery Care  Goal: Urine output is 30 mL/hour or more  Description: INTERVENTIONS:  1. Monitor intake and output   Outcome: Progressing  Flowsheets (Taken 2021)  Urine output is 30 mL/hr or more: Monitor intake and output     Problem:  Recovery Care  Goal: Fundus firm at midline  Description: INTERVENTIONS:  1. Assess fundus   2. Do lochia check   Outcome: Progressing  Flowsheets (Taken 2021)  Fundus firm at midline:   Assess fundus   Do lochia check     Problem: Hemodynamic Status  Goal: Patient's vitals signs are stable  Description: INTERVENTIONS:  1. Assess and monitor patient's heart rate, rhythm, respiratory rate, peripheral pulses, capillary refill, color, body temperature, intake and output, labs and physical activity tolerance  2. Observe for signs of chest pain (note location, duration, severity, radiation and associated symptoms such as diaphoresis, nausea, indigestion)  3. Monitor for signs and symptoms of heart failure (e.g. shortness of breath, edema of feet/ankles/legs, rapid irregular heart rate, coughing, wheezing, white/pink blood tinged sputum, sudden weight gain, chest pain)  4. Collaborate with interdisciplinary team and initiate plan and interventions as ordered  5. Position patient for maximum circulation/cardiac output   6. Monitor fluid intake   7. Plan activities to conserve energy   8. Include patient/family/caregiver in decisions related to anxiety   Outcome: Progressing  Flowsheets (Taken 2021)  Patient's vital signs are stable:   Assess and monitor patient's heart rate, rhythm, respiratory rate, peripheral pulses, capillary refill, color, body temperature, intake and output,  labs and physical activity tolerance   Observe for signs of chest pain (note location, duration, severity, radiation and associated symptoms such as diaphoresis, nausea, indigestion)   Monitor for signs and symptoms of heart failure (e.g. shortness of breath, edema of feet/ankles/legs, rapid irregular heart rate, coughing, wheezing, white/pink blood tinged sputum, sudden weight gain, chest pain)   Collaborate with interdisciplinary team and initiate plan and interventions as ordered   Position patient for maximum circulation/cardiac output   Monitor fluid intake   Plan activities to conserve energy   Include patient/family/caregiver in decisions related to anxiety     Problem: Safety  Goal: Free from accidental physical injury  Description: INTERVENTIONS:  1. Assess patient for fall risk  2. Initiate fall precautions   3. Provide and maintain a safe environment   4. Keep bed in low position  5. Keep bed wheels locked   6. Keep patient's call light within reach   7. Ensure patient's ID band is correct and in place   8. Use appropriate transfer methods  9. Ensure appropriate safety devices are available at the bedside  10. Include patient/family/caregiver in decisions related to safety   Outcome: Progressing  Flowsheets (Taken 1/4/2021 1524)  Free from accidental physical injury:   Assess patient for fall risk   Keep bed in low position   Ensure patient's ID band is correct and in place   Include patient/family/caregiver in decisions related to safety   Initiate fall precautions   Keep bed wheels locked   Provide and maintain a safe environment   Keep patient's call light within reach   Ensure appropriate safety devices are available at the bedside   Use appropriate transfer methods     Problem: Safety  Goal: Free from abuse  Description: INTERVENTIONS:   1. Communicate suspicion of abuse to physician and    2. Maintain or place patient into protective status if needed   Outcome: Progressing  Flowsheets  (Taken 1/4/2021 1524)  Free from intentional harm: Communicate suspicion of abuse to physician and

## 2021-01-04 NOTE — ANESTHESIA POSTPROCEDURE EVALUATION
Patient: Magy Marin    Procedure Summary     Date: 21 Room / Location: [facility] OR 1 / [facility] L&D OR    Anesthesia Start: 1347 Anesthesia Stop: 1447    Procedure:  SECTION (N/A Uterus) Diagnosis: (breech presentation)    Surgeons: Irene Louie MD Responsible Provider: Dilip Cornejo MD    Anesthesia Type: epidural ASA Status: 2          Anesthesia Type: epidural    Last vitals  Vitals Value Taken Time   /73 21 1530   Temp 36.4 °C (97.5 °F) 21 1530   Pulse 54 21 1530   Resp 14 21 1530   SpO2 94 % 21 1530   Pain Score 0 - No pain 21 1530       Anesthesia Post Evaluation    Patient location during evaluation: bedside  Patient participation: complete - patient participated  Level of consciousness: awake and alert  Pain management: adequate  Airway patency: patent  Anesthetic complications: no  Cardiovascular status: acceptable  Respiratory status: acceptable  Hydration status: acceptable  May dismiss recovered patient based on consultation with the appropriate physicians and/or meeting appropriate discharge criteria      Cosmetic?  This procedure is not cosmetic.

## 2021-01-04 NOTE — ANESTHESIA PROCEDURE NOTES
Spinal Block    Patient location during procedure: OB  Start time: 1/4/2021 1:53 PM  End time: 1/4/2021 1:57 PM  Reason for block: primary anesthetic    Staffing  CRNA: Shannon Flanagan CRNA  Other anesthesia staff: ANUJA Michaels  Performed: other anesthesia staff   Preanesthetic Checklist  Completed: patient identified, IV checked, site marked, risks and benefits discussed, surgical consent, monitors and equipment checked, pre-op evaluation and timeout performed  Spinal Block  Patient position: sitting  Prep: Betadine and site prepped and draped  Patient monitoring: EKG, blood pressure monitoring and continuous pulse oximetry  Approach: midline  Location: L3-4  Injection technique: single-shot  Procedure: negative aspiration for blood,  no paresthesia and positive aspiration for clear CSF  Local infiltration: lidocaine 1%  Needle  Needle type: Carlos   Needle gauge: 25 G  Needle length: 3.5 in  Needle introducer used: Yes  Epinephrine wash performed: Yes  Medications Administered  Morphine (PF) (DURAMORPH) injection 1 mg/mL - Intrathecal, 0.2 mg  BUPivacaine 0.75%-dextrose 8.25% (SENSORCAINE)(PF) injection - Intrathecal, 1.4 mL  Assessment  Sensory level: T6  Events: well tolerated

## 2021-01-04 NOTE — OP NOTE
OB  Delivery Note    Date: 2021    Name: Magy Marin    Review the Delivery Report for details.     Labor Complications:      Delivery Type: , Low Transverse        1 Minute 5 Minute   Apgar Totals: 8   9        Details    Pre-Op Diagnosis: 1. Intrauterine pregnancy at 39w3d  2. Breech   Post-Op Diagnosis: 1. Same   Indications:  Breech    Procedure: 1. Primary  , Low Transverse  via Low Transverse  incision.   2.     Anesthesia: Spinal    Specimens: No specimens collected during this procedure.   EBL:   400 cc   Findings:   Male infant delivered, weight pending   Complications: none     Informed Consent:  The risks, benefits, complications, and alternatives were discussed with the patient. The patient understood that the risks of  section include, but are not limited to: injury to nearby structures or organs, infection, blood loss and possible need for transfusion, and potential need for more surgery including hysterectomy. The patient stated understanding and desired to proceed. All questions were answered. The site of surgery was properly noted and marked. A Time Out was held and the above information confirmed.    Procedure Details:  The patient was taken to the operating room and prepped and draped for an abdominal procedure. A Pfannenstiel incision was created in the usual fashion. The abdomen was entered. The lower uterine segment was identified. A bladder flap was created and the bladder was advanced out of the operative field. The lower uterine segment was entered transversely. The amniotic fluid was noted to be clear. The baby was transverse, back up, and delivered as double footling breech. Liveborn male, weight pending, Apgars 8 and 9. Two tight nuchal cords noted at time of delivery. The placenta was delivered manually. A 3 vessel cord was noted. The uterine incision was closed in two locked layers with the second imbricating the first. The tubes  and ovaries were noted to be within normal limits. The paracolic gutters were cleaned of clots and debris. The fascial defect was reapproximmated. The skin was closed with subcuticular Monocryl. The incision was appropriately dressed. With all counts correct, the patient was taken to recovery in stable condition.  _______________________________  Irene Louie MD  01/04/21 2:45 PM

## 2021-01-04 NOTE — PLAN OF CARE
Problem: Pain - Adult  Goal: Verbalizes/displays adequate comfort level or baseline comfort level  Description: INTERVENTIONS:  1. Encourage patient to monitor pain and request interventions  2. Assess pain using the appropriate pain scale  3. Administer analgesics based on type and severity of pain and evaluate response  4. Educate/Implement non-pharmacological measures as appropriate and evaluate response  5. Consider cultural, developmental and social influences on pain and pain management  6. Notify Provider if interventions unsuccessful or patient reports new pain  Outcome: Progressing  Flowsheets (Taken 1/4/2021 1311)  Verbalizes/displays adequate comfort level or baseline comfort level:   Encourage patient to monitor pain and request interventions   Assess pain using the appropriate pain scale   Administer analgesics based on type and severity of pain and evaluate response   Educate/Implement non-pharmacological measures as appropriate and evaluate response   Consider cultural, developmental and social influences on pain and pain management   Notify Provider if interventions unsuccessful or patient reports new pain     Problem: Infection - Adult  Goal: Absence of infection during hospitalization  Description: INTERVENTIONS:  1. Assess and monitor for signs and symptoms of infection  2. Monitor lab/diagnostic results  3. Monitor all insertion sites/wounds/incisions  4. Monitor secretions for changes in amount and color  5. Administer medications as ordered  6. Educate and encourage patient and family to use good hand hygiene technique  7. Identify and educate in appropriate isolation precautions for identified infection/condition  Outcome: Progressing  Flowsheets (Taken 1/4/2021 1311)  Absence of infection during hospitalization:   Assess and monitor for signs and symptoms of infection   Monitor all insertion sites/wounds/incisions   Administer medications as ordered   Identify and educate in appropriate  isolation precautions for identified infection/condition   Monitor lab/diagnostic results   Monitor secretions for changes in amount and colo   Educate and encourage patient and family to use good hand hygiene technique     Problem: Safety Adult - Fall  Goal: Free from fall injury  Description: INTERVENTIONS:    Inpatient - Please reference Cares/Safety Flowsheet under [place] Fall Risk for interventions.  Pediatrics - Please reference Peds Daily Cares/Safety Flowsheet under [place] Pediatric Fall Assessment Fall Bundle for interventions  LD/OB - Please reference OB Shift Screening Flowsheet under OB Fall Risk for interventions.  Outcome: Progressing  Note: Bed in lowest position. Call light and belongings within reach. Patient oriented to room and call light.     Problem: Discharge Planning  Goal: Discharge to home or other facility with appropriate resources  Description: INTERVENTIONS:  1. Identify and discuss barriers to discharge with patient and caregiver.  2. Arrange for needed discharge resources and transportation as appropriate.  3. Identify discharge learning needs (meds, wound care, etc).  4. Arrange for interpreters to assist at discharge as needed.  5. Refer to  for coordinating discharge planning if the patient needs post-hospital services based on physician order or complex needs related to functional status, cognitive ability or social support system.  Outcome: Progressing  Flowsheets (Taken 2021 1311)  Discharge to home or other facility with appropriate resources: Identify and discuss barriers to discharge with patient and caregiver     Problem: Vaginal Birth or  Section  Goal: Fetal and maternal status remain reassuring during the birth process  Description: INTERVENTIONS:  1. Monitor vital signs  2. Monitor fetal heart rate  3. Monitor uterine activity  4. Monitor labor progression (vaginal delivery)  5. DVT prophylaxis (C/S delivery)  6. Surgical antibiotic prophylaxis  (C/S delivery)  Outcome: Progressing  Flowsheets (Taken 2021 1311)  Fetal and maternal status remain reassuring during the birth process:   Monitor vital signs   Monitor fetal heart rate   Monitor uterine activity   Deep vein thrombosis prophylaxis ( delivery)   Surgical antibiotic prophylaxis ( delivery)

## 2021-01-05 LAB
ERYTHROCYTE [DISTWIDTH] IN BLOOD BY AUTOMATED COUNT: 15.4 % (ref 11.5–14)
HCT VFR BLD AUTO: 28.6 % (ref 34–45)
HGB BLD-MCNC: 9.7 G/DL (ref 11.5–15.5)
MCH RBC QN AUTO: 28 PG (ref 28–33)
MCHC RBC AUTO-ENTMCNC: 33.9 G/DL (ref 32–36)
MCV RBC AUTO: 82.6 FL (ref 81–97)
PLATELET # BLD AUTO: 177 10*3/UL (ref 140–350)
PMV BLD AUTO: 9.1 FL (ref 6.9–10.8)
RBC # BLD AUTO: 3.46 10*6/ΜL (ref 3.7–5.3)
WBC # BLD AUTO: 8.1 10*3/UL (ref 4.5–10.5)

## 2021-01-05 PROCEDURE — 85027 COMPLETE CBC AUTOMATED: CPT | Performed by: OBSTETRICS & GYNECOLOGY

## 2021-01-05 PROCEDURE — 6370000100 HC RX 637 (ALT 250 FOR IP): Performed by: OBSTETRICS & GYNECOLOGY

## 2021-01-05 PROCEDURE — 2590000100 HC RX 259: Performed by: OBSTETRICS & GYNECOLOGY

## 2021-01-05 PROCEDURE — 6360000200 HC RX 636 W HCPCS (ALT 250 FOR IP): Performed by: ANESTHESIOLOGY

## 2021-01-05 PROCEDURE — 6370000100 HC RX 637 (ALT 250 FOR IP): Performed by: ANESTHESIOLOGY

## 2021-01-05 PROCEDURE — (BLANK) HC ROOM PRIVATE OBSTETRICS

## 2021-01-05 PROCEDURE — 2580000300 HC RX 258: Performed by: OBSTETRICS & GYNECOLOGY

## 2021-01-05 RX ORDER — FERROUS SULFATE 325(65) MG
325 TABLET ORAL 2 TIMES DAILY WITH MEALS
Status: DISCONTINUED | OUTPATIENT
Start: 2021-01-05 | End: 2021-01-06 | Stop reason: HOSPADM

## 2021-01-05 RX ADMIN — FERROUS SULFATE TAB 325 MG (65 MG ELEMENTAL FE) 325 MG: 325 (65 FE) TAB at 17:41

## 2021-01-05 RX ADMIN — OXYCODONE HYDROCHLORIDE 5 MG: 5 TABLET ORAL at 14:28

## 2021-01-05 RX ADMIN — FERROUS SULFATE TAB 325 MG (65 MG ELEMENTAL FE) 325 MG: 325 (65 FE) TAB at 09:34

## 2021-01-05 RX ADMIN — KETOROLAC TROMETHAMINE 30 MG: 30 INJECTION, SOLUTION INTRAMUSCULAR at 07:12

## 2021-01-05 RX ADMIN — HYDROCODONE BITARTRATE AND ACETAMINOPHEN 1 TABLET: 5; 325 TABLET ORAL at 20:22

## 2021-01-05 RX ADMIN — SODIUM CHLORIDE, POTASSIUM CHLORIDE, SODIUM LACTATE AND CALCIUM CHLORIDE 125 ML/HR: 600; 310; 30; 20 INJECTION, SOLUTION INTRAVENOUS at 02:15

## 2021-01-05 RX ADMIN — IBUPROFEN 800 MG: 800 TABLET, FILM COATED ORAL at 17:41

## 2021-01-05 RX ADMIN — Medication 100 MG: at 20:22

## 2021-01-05 RX ADMIN — OXYCODONE HYDROCHLORIDE 5 MG: 5 TABLET ORAL at 02:12

## 2021-01-05 RX ADMIN — Medication 1000 MG: at 05:23

## 2021-01-05 RX ADMIN — Medication 100 MG: at 09:34

## 2021-01-05 RX ADMIN — Medication 1000 MG: at 12:29

## 2021-01-05 NOTE — PROGRESS NOTES
Daily Progress Note      Subjective     Interval History: none. Cath out. Pain reasonable. Working on breastfeeding    Objective     Vital signs in last 24 hours:  Temp:  [36.4 °C (97.5 °F)-37.2 °C (98.9 °F)] 36.7 °C (98.1 °F)  Heart Rate:  [54-96] 70  Resp:  [14-20] 16  BP: ()/(64-98) 121/82    Intake/Output last 3 shifts:  I/O last 3 completed shifts:  In: 2300 [I.V.:2300]  Out: 840 [Urine:275; Blood:565]  Intake/Output this shift:  I/O this shift:  In: 500 [P.O.:500]  Out: 1150 [Urine:1150]    Physical Exam:  Incision dry  Fundus firm    Assessment/Plan  PO#1 primary section for breech. Unremarkable postop course.

## 2021-01-05 NOTE — PROGRESS NOTES
Date of service:2021    Subjective: Patient states her pain is well controlled and she has no issues.    Objective:  Vitals: Stable  General: Alert, no acute distress  Neurologic: Intact    Assessment: Postoperative day #1 status post  with intrathecal opioid for postoperative pain control.    Plan: Continue oral analgesics

## 2021-01-05 NOTE — NURSING END OF SHIFT
Nursing End of Shift Summary    Goals:  Clinical Goals for the Shift:  delivery    Narrative Summary of Progress Towards Clinical Goals:   of infant male.    Barriers for Transfer or Discharge: yes  Patient reporting adequate pain control. L&D recovery complete.    SBAR to MISAEL Jean Baptiste RN to assume care of couplet.

## 2021-01-05 NOTE — LACTATION NOTE
This note was copied from a baby's chart.  Lactation Consult    Reason for Consult: Initial assessment, First time breastfeeding mom    Mother's Name: Magy Marin     SUBJECTIVE/OBJECTIVE  : 2021  Gestational Age: 39w3d AGA infant.   Feeding Narrative: lac consult r/t primiparity.    Type of delivery: , Low Transverse    Birth weight 8 lb 2.7 oz (3706 g)   Weight change since birth Pct Wt Change: 0 %   Current/Previous Weight Patient Weights for the past 8760 hrs (Last 2 readings):   Weight   21 1409 3706 g      Weight Change  Since Last Visit: 3706 g       Maternal history includes:  25 y.o.      Patient Active Problem List    Diagnosis Date Noted   • Breech presentation 2021      History reviewed. No pertinent past medical history.    Past Surgical History:   Procedure Laterality Date   • WISDOM TOOTH EXTRACTION        Social History     Tobacco Use   • Smoking status: Never Smoker   • Smokeless tobacco: Never Used   Substance and Sexual Activity   • Alcohol use: Not Currently   • Drug use: Never   • Sexual activity: Defer   Social History Narrative   • Not on file      Has mother  before?: No            ASSESSMENT  Left Breast: WDL Right Breast: WDL   Right Nipple: WDL          Pre-Consult Assessment   Feeding Frequency: enc attempting q3hrs  Latch Assessment: Correct, Audible Swallowing, Sustained  Latch Pain: 0  Latch Pain Duration: Pain disipates less than 1 minute  Supplementation: No    Post-Consult Assessment  Feeding Duration: w/lc at 0920; assisted with positioning to R breast. infant sustained x 8 mins.  Latch Assessment: Correct, Audible Swallowing, Sustained  Latch Pain: 0  Interventions: Reposition Latch at Breast     Pump: None    PLAN  General breastfeeding and outpt info provided. Plans for LC to follow up with next feeding session and prn.  -----------------------------------------------  Karly Quinn RN  21 10:46  AM

## 2021-01-05 NOTE — PLAN OF CARE
Problem: Pain - Adult  Goal: Verbalizes/displays adequate comfort level or baseline comfort level  Description: INTERVENTIONS:  1. Encourage patient to monitor pain and request interventions  2. Assess pain using the appropriate pain scale  3. Administer analgesics based on type and severity of pain and evaluate response  4. Educate/Implement non-pharmacological measures as appropriate and evaluate response  5. Consider cultural, developmental and social influences on pain and pain management  6. Notify Provider if interventions unsuccessful or patient reports new pain  Outcome: Progressing  Flowsheets (Taken 1/4/2021 1834)  Verbalizes/displays adequate comfort level or baseline comfort level:   Encourage patient to monitor pain and request interventions   Assess pain using the appropriate pain scale   Administer analgesics based on type and severity of pain and evaluate response   Educate/Implement non-pharmacological measures as appropriate and evaluate response   Consider cultural, developmental and social influences on pain and pain management   Notify Provider if interventions unsuccessful or patient reports new pain     Problem: Infection - Adult  Goal: Absence of infection during hospitalization  Description: INTERVENTIONS:  1. Assess and monitor for signs and symptoms of infection  2. Monitor lab/diagnostic results  3. Monitor all insertion sites/wounds/incisions  4. Monitor secretions for changes in amount and color  5. Administer medications as ordered  6. Educate and encourage patient and family to use good hand hygiene technique  7. Identify and educate in appropriate isolation precautions for identified infection/condition  Outcome: Progressing  Flowsheets (Taken 1/4/2021 1834)  Absence of infection during hospitalization:   Assess and monitor for signs and symptoms of infection   Monitor lab/diagnostic results   Monitor all insertion sites/wounds/incisions   Monitor secretions for changes in amount  and colo   Administer medications as ordered   Educate and encourage patient and family to use good hand hygiene technique   Identify and educate in appropriate isolation precautions for identified infection/condition     Problem: Safety Adult - Fall  Goal: Free from fall injury  Description: INTERVENTIONS:    Inpatient - Please reference Cares/Safety Flowsheet under [place] Fall Risk for interventions.  Pediatrics - Please reference Peds Daily Cares/Safety Flowsheet under [facility] Pediatric Fall Assessment Fall Bundle for interventions  LD/OB - Please reference OB Shift Screening Flowsheet under OB Fall Risk for interventions.  Outcome: Progressing     Problem: Discharge Planning  Goal: Discharge to home or other facility with appropriate resources  Description: INTERVENTIONS:  1. Identify and discuss barriers to discharge with patient and caregiver.  2. Arrange for needed discharge resources and transportation as appropriate.  3. Identify discharge learning needs (meds, wound care, etc).  4. Arrange for interpreters to assist at discharge as needed.  5. Refer to  for coordinating discharge planning if the patient needs post-hospital services based on physician order or complex needs related to functional status, cognitive ability or social support system.  Outcome: Progressing  Flowsheets (Taken 1/4/2021 3878)  Discharge to home or other facility with appropriate resources:   Identify and discuss barriers to discharge with patient and caregiver   Arrange for needed discharge resources and transportation as appropriate   Identify discharge learning needs (meds, wound care, etc)   Refer to  for coordinating discharge planning if patient needs post-hospital services based on physician order or complex needs related to functional status, cognitive ability or social support system     Problem: Knowledge Deficit  Goal: Patient/family/caregiver demonstrates understanding of disease process,  treatment plan, medications, and discharge instructions  Description: INTERVENTIONS:   1. Complete learning assessment and assess knowledge base  2. Provide teaching at level of understanding   3. Provide teaching via preferred learning methods  Outcome: Progressing  Flowsheets (Taken 1/4/2021 1834)  Patient/family/caregiver demonstrates understanding of disease process, treatment plan, medications, and discharge instructions:   Complete learning assessment and assess knowledge base   Provide teaching at level of understanding   Provide teaching via preferred learning methods     Problem: Potential for Compromised Skin Integrity  Goal: Skin Integrity is Maintained or Improved  Description: INTERVENTIONS:  1. Assess and monitor skin integrity  2. Collaborate with interdisciplinary team and initiate plans and interventions as needed  3. Alternate a full bath with partial baths for elderly   4. Monitor patient's hygiene practices   5. Collaborate with wound, ostomy, and continence nurse  Outcome: Progressing  Flowsheets (Taken 1/4/2021 1834)  Skin integrity is maintained or improved:   Assess and monitor skin integrity   Monitor patient's hygiene practices   Collaborate with interdisciplinary team and initiate plans and interventions as needed  Goal: Nutritional status is improving  Description: INTERVENTIONS:  1. Monitor and assess patient for malnutrition (ex- brittle hair, bruises, dry skin, pale skin and conjunctiva, muscle wasting, smooth red tongue, and disorientation)  2. Monitor patient's weight and dietary intake as ordered or per policy  3. Determine patient's food preferences and provide high-protein, high-caloric foods as appropriate  4. Assist patient with eating   5. Allow adequate time for meals   6. Encourage patient to take dietary supplement as ordered   7. Collaborate with dietitian  8. Include patient/family/caregiver in decisions related to nutrition  Outcome: Progressing  Flowsheets (Taken  1/4/2021 1834)  Nutritional status is improving: Allow adequate time for meals  Goal: MOBILITY IS MAINTAINED OR IMPROVED  Description: INTERVENTIONS  1. Collaborate with interdisciplinary team and initiate plan and interventions as ordered (PT/OT)  2. Encourage ambulation  3. Up to chair for meals  4. Monitor for signs of deconditioning  Outcome: Progressing  Flowsheets (Taken 1/4/2021 1834)  Mobility is Maintained or Improved:   Encourage ambulation   Monitor for signs of deconditioning     Problem: Urinary Incontinence  Goal: Perineal skin integrity is maintained or improved  Description: INTERVENTIONS:  1. Assess genitourinary system, perineal skin, labs (urinalysis), and history of incontinence to include past management, aggravating, and alleviating factors  2. Collaborate with interdisciplinary team including wound, ostomy, and continence nurse and initiate plans and interventions as needed  4. Consider urine containment device  5. Apply skin protectant   6. Develop skin care regimen  7. Provide privacy when changing patient's incontinence device to maintain their dignity  Outcome: Progressing  Flowsheets (Taken 1/4/2021 1834)  Perineal skin integrity is maintained or improved: Assess genitourinary system, perineal skin, labs (urinalysis), and history of incontinence to include past management, aggravating, and alleviating factors

## 2021-01-05 NOTE — PLAN OF CARE
Problem: Pain - Adult  Goal: Verbalizes/displays adequate comfort level or baseline comfort level  Description: INTERVENTIONS:  1. Encourage patient to monitor pain and request interventions  2. Assess pain using the appropriate pain scale  3. Administer analgesics based on type and severity of pain and evaluate response  4. Educate/Implement non-pharmacological measures as appropriate and evaluate response  5. Consider cultural, developmental and social influences on pain and pain management  6. Notify Provider if interventions unsuccessful or patient reports new pain  Outcome: Progressing  Flowsheets (Taken 1/5/2021 0729)  Verbalizes/displays adequate comfort level or baseline comfort level:   Encourage patient to monitor pain and request interventions   Assess pain using the appropriate pain scale   Administer analgesics based on type and severity of pain and evaluate response   Educate/Implement non-pharmacological measures as appropriate and evaluate response   Consider cultural, developmental and social influences on pain and pain management   Notify Provider if interventions unsuccessful or patient reports new pain     Problem: Infection - Adult  Goal: Absence of infection during hospitalization  Description: INTERVENTIONS:  1. Assess and monitor for signs and symptoms of infection  2. Monitor lab/diagnostic results  3. Monitor all insertion sites/wounds/incisions  4. Monitor secretions for changes in amount and color  5. Administer medications as ordered  6. Educate and encourage patient and family to use good hand hygiene technique  7. Identify and educate in appropriate isolation precautions for identified infection/condition  Outcome: Progressing  Flowsheets (Taken 1/5/2021 0729)  Absence of infection during hospitalization:   Assess and monitor for signs and symptoms of infection   Monitor lab/diagnostic results   Monitor all insertion sites/wounds/incisions   Educate and encourage patient and family  to use good hand hygiene technique     Problem: Safety Adult - Fall  Goal: Free from fall injury  Description: INTERVENTIONS:    Inpatient - Please reference Cares/Safety Flowsheet under [place] Fall Risk for interventions.  Pediatrics - Please reference Peds Daily Cares/Safety Flowsheet under [facility] Pediatric Fall Assessment Fall Bundle for interventions  LD/OB - Please reference OB Shift Screening Flowsheet under OB Fall Risk for interventions.  Outcome: Progressing     Problem: Discharge Planning  Goal: Discharge to home or other facility with appropriate resources  Description: INTERVENTIONS:  1. Identify and discuss barriers to discharge with patient and caregiver.  2. Arrange for needed discharge resources and transportation as appropriate.  3. Identify discharge learning needs (meds, wound care, etc).  4. Arrange for interpreters to assist at discharge as needed.  5. Refer to  for coordinating discharge planning if the patient needs post-hospital services based on physician order or complex needs related to functional status, cognitive ability or social support system.  Outcome: Progressing  Flowsheets (Taken 1/5/2021 0729)  Discharge to home or other facility with appropriate resources:   Identify and discuss barriers to discharge with patient and caregiver   Identify discharge learning needs (meds, wound care, etc)     Problem: Knowledge Deficit  Goal: Patient/family/caregiver demonstrates understanding of disease process, treatment plan, medications, and discharge instructions  Description: INTERVENTIONS:   1. Complete learning assessment and assess knowledge base  2. Provide teaching at level of understanding   3. Provide teaching via preferred learning methods  Outcome: Progressing  Flowsheets (Taken 1/5/2021 0729)  Patient/family/caregiver demonstrates understanding of disease process, treatment plan, medications, and discharge instructions:   Complete learning assessment and assess  knowledge base   Provide teaching at level of understanding     Problem: Potential for Compromised Skin Integrity  Goal: MOBILITY IS MAINTAINED OR IMPROVED  Description: INTERVENTIONS  1. Collaborate with interdisciplinary team and initiate plan and interventions as ordered (PT/OT)  2. Encourage ambulation  3. Up to chair for meals  4. Monitor for signs of deconditioning  Outcome: Progressing  Flowsheets (Taken 2021)  Mobility is Maintained or Improved: Encourage ambulation     Problem: Postpartum  Goal: Experiences normal postpartum course  Description: INTERVENTIONS:  1. Monitor maternal vital signs  2. Assess uterine involution  Outcome: Progressing  Flowsheets (Taken 2021)  Experiences normal postpartum course:   Monitor maternal vital signs   Assess uterine involution  Goal: Appropriate maternal -  bonding  Description: INTERVENTIONS:  1. Identify family support  2. Referral to  or  as needed  Outcome: Progressing  Flowsheets (Taken 2021)  Appropriate maternal- bonding: Identify family support  Goal: Establishment of infant feeding pattern  Description: INTERVENTIONS:  1. Assess breast/bottle feeding  2. Refer to lactation as needed  Outcome: Progressing  Flowsheets (Taken 2021)  Establishment of infant feeding pattern:   Assess breast/bottle feeding   Refer to lactation as needed  Goal: Incisions, wounds, or drain sites healing without S/S of infection  Description: INTERVENTIONS  1. Assess and document risk factors for skin breakdown  2. Assess and document skin integrity  3. Assess and document dressing/incision, wound bed, drain sites and surrounding tissue  4. Implement wound care per orders  Outcome: Progressing  Flowsheets (Taken 2021)  Incision(s), wound(s) or drain site(s) healing without S/S of infection:   Assess and document risk factors for skin breakdown   Assess and document skin integrity   Assess and document  dressing/incision, wound bed, drain sites and surrounding tissue   Implement wound care per orders

## 2021-01-05 NOTE — NURSING END OF SHIFT
Nursing End of Shift Summary    Goals:  Clinical Goals for the Shift: Pain control and rest    Narrative Summary of Progress Towards Clinical Goals:  Pt had good pain control with tylenol and vandana, little rest this afternoon.    Barriers for Transfer or Discharge: Yes she was a c/s this afternoon.

## 2021-01-06 VITALS
WEIGHT: 161.16 LBS | BODY MASS INDEX: 27.51 KG/M2 | RESPIRATION RATE: 16 BRPM | HEIGHT: 64 IN | OXYGEN SATURATION: 97 % | TEMPERATURE: 98.1 F | SYSTOLIC BLOOD PRESSURE: 116 MMHG | DIASTOLIC BLOOD PRESSURE: 66 MMHG | HEART RATE: 63 BPM

## 2021-01-06 PROCEDURE — 2590000100 HC RX 259: Performed by: OBSTETRICS & GYNECOLOGY

## 2021-01-06 PROCEDURE — 6370000100 HC RX 637 (ALT 250 FOR IP): Performed by: OBSTETRICS & GYNECOLOGY

## 2021-01-06 RX ORDER — HYDROCODONE BITARTRATE AND ACETAMINOPHEN 5; 325 MG/1; MG/1
1 TABLET ORAL EVERY 4 HOURS PRN
Qty: 12 TABLET | Refills: 0 | Status: SHIPPED | OUTPATIENT
Start: 2021-01-06 | End: 2021-01-09

## 2021-01-06 RX ORDER — IBUPROFEN 800 MG/1
800 TABLET ORAL EVERY 6 HOURS PRN
Qty: 15 TABLET | Refills: 0 | Status: SHIPPED | OUTPATIENT
Start: 2021-01-06 | End: 2021-01-16

## 2021-01-06 RX ORDER — FERROUS SULFATE 325(65) MG
325 TABLET ORAL 2 TIMES DAILY WITH MEALS
Qty: 60 TABLET | Refills: 0 | Status: SHIPPED | OUTPATIENT
Start: 2021-01-06 | End: 2022-01-06

## 2021-01-06 RX ORDER — DOCUSATE SODIUM 100 MG/1
100 CAPSULE, LIQUID FILLED ORAL 2 TIMES DAILY
Qty: 20 CAPSULE | Refills: 0 | Status: SHIPPED | OUTPATIENT
Start: 2021-01-06 | End: 2021-01-16

## 2021-01-06 RX ADMIN — IBUPROFEN 800 MG: 800 TABLET, FILM COATED ORAL at 02:01

## 2021-01-06 RX ADMIN — Medication 100 MG: at 08:21

## 2021-01-06 RX ADMIN — FERROUS SULFATE TAB 325 MG (65 MG ELEMENTAL FE) 325 MG: 325 (65 FE) TAB at 08:21

## 2021-01-06 RX ADMIN — IBUPROFEN 800 MG: 800 TABLET, FILM COATED ORAL at 09:45

## 2021-01-06 NOTE — LACTATION NOTE
This note was copied from a baby's chart.  Lactation Consult    Reason for Consult: Follow-up assessment, First time breastfeeding mom, Breast/nipple pain    Mother's Name: Magy Marin     SUBJECTIVE/OBJECTIVE  : 2021  Gestational Age: 39w3d AGA infant.   Feeding Narrative: Follow up to assess progress with feeding.  Mother reports some initial latch pain with feeding.  Assisted with asymmetrical latch technique and positioning.    Type of delivery: , Low Transverse    Birth weight 8 lb 2.7 oz (3706 g)   Weight change since birth Pct Wt Change: -6.98 %   Current/Previous Weight Patient Weights for the past 8760 hrs (Last 2 readings):   Weight   21 1450 3447 g   21 1409 3706 g      Weight Change  Since Last Visit: -259 g       Maternal history includes:  25 y.o.      Patient Active Problem List    Diagnosis Date Noted   •  delivery delivered 2021   • Breech presentation 2021      History reviewed. No pertinent past medical history.    Past Surgical History:   Procedure Laterality Date   •  SECTION, LOW TRANSVERSE N/A 2021    Procedure:  SECTION;  Surgeon: Irene Louie MD;  Location: [facility] L&D OR;  Service: Obstetrics;  Laterality: N/A;   • WISDOM TOOTH EXTRACTION        Social History     Tobacco Use   • Smoking status: Never Smoker   • Smokeless tobacco: Never Used   Substance and Sexual Activity   • Alcohol use: Not Currently   • Drug use: Never   • Sexual activity: Defer   Social History Narrative   • Not on file      Has mother  before?: No            ASSESSMENT  Left Breast: WDL Right Breast: WDL Left Nipple: Tender Right Nipple: Tender          Pre-Consult Assessment   Feeding Frequency: Q1-3H  Feeding Duration: 10-30 min  Latch Assessment: Correct  Latch Pain: 3  Latch Pain Duration: Pain disipates less than 1 minute  Supplementation: No    Post-Consult Assessment  Feeding Duration: Assisted with cross cradle  positioning and asymmetrical latch technique.  Mother returns demo x 2.  15 min observed.  Latch Assessment: Correct, Audible Swallowing, Sustained  Latch Pain: 0  Supplementation: No  Interventions: EBM to nipple, Skin-to-Skin, Reposition Latch at Breast, Finger Feed Before Latch     Pump: None    PLAN  Continue exclusive breastfeeding.  General breastfeeding education provided and informed of outpatient LCS.  Will continue to follow PRN.     -----------------------------------------------  Amber Meléndez RN  01/06/21 12:34 PM

## 2021-01-06 NOTE — PLAN OF CARE
Problem: Pain - Adult  Goal: Verbalizes/displays adequate comfort level or baseline comfort level  Description: INTERVENTIONS:  1. Encourage patient to monitor pain and request interventions  2. Assess pain using the appropriate pain scale  3. Administer analgesics based on type and severity of pain and evaluate response  4. Educate/Implement non-pharmacological measures as appropriate and evaluate response  5. Consider cultural, developmental and social influences on pain and pain management  6. Notify Provider if interventions unsuccessful or patient reports new pain  Outcome: Progressing  Flowsheets (Taken 1/6/2021 0825)  Verbalizes/displays adequate comfort level or baseline comfort level:   Encourage patient to monitor pain and request interventions   Assess pain using the appropriate pain scale   Administer analgesics based on type and severity of pain and evaluate response     Problem: Infection - Adult  Goal: Absence of infection during hospitalization  Description: INTERVENTIONS:  1. Assess and monitor for signs and symptoms of infection  2. Monitor lab/diagnostic results  3. Monitor all insertion sites/wounds/incisions  4. Monitor secretions for changes in amount and color  5. Administer medications as ordered  6. Educate and encourage patient and family to use good hand hygiene technique  7. Identify and educate in appropriate isolation precautions for identified infection/condition  Outcome: Progressing  Flowsheets (Taken 1/6/2021 0825)  Absence of infection during hospitalization: Monitor lab/diagnostic results     Problem: Safety Adult - Fall  Goal: Free from fall injury  Description: INTERVENTIONS:    Inpatient - Please reference Cares/Safety Flowsheet under [place] Fall Risk for interventions.  Pediatrics - Please reference Peds Daily Cares/Safety Flowsheet under [facility] Pediatric Fall Assessment Fall Bundle for interventions  LD/OB - Please reference OB Shift Screening Flowsheet under OB Fall Risk  for interventions.  Outcome: Progressing     Problem: Discharge Planning  Goal: Discharge to home or other facility with appropriate resources  Description: INTERVENTIONS:  1. Identify and discuss barriers to discharge with patient and caregiver.  2. Arrange for needed discharge resources and transportation as appropriate.  3. Identify discharge learning needs (meds, wound care, etc).  4. Arrange for interpreters to assist at discharge as needed.  5. Refer to  for coordinating discharge planning if the patient needs post-hospital services based on physician order or complex needs related to functional status, cognitive ability or social support system.  Outcome: Progressing  Flowsheets (Taken 2021)  Discharge to home or other facility with appropriate resources: Identify and discuss barriers to discharge with patient and caregiver     Problem: Postpartum  Goal: Experiences normal postpartum course  Description: INTERVENTIONS:  1. Monitor maternal vital signs  2. Assess uterine involution  Outcome: Progressing  Flowsheets (Taken 2021)  Experiences normal postpartum course:   Monitor maternal vital signs   Assess uterine involution  Goal: Appropriate maternal -  bonding  Description: INTERVENTIONS:  1. Identify family support  2. Referral to  or  as needed  Outcome: Progressing  Flowsheets (Taken 2021)  Appropriate maternal- bonding: Identify family support  Goal: Establishment of infant feeding pattern  Description: INTERVENTIONS:  1. Assess breast/bottle feeding  2. Refer to lactation as needed  Outcome: Progressing  Flowsheets (Taken 2021)  Establishment of infant feeding pattern:   Assess breast/bottle feeding   Refer to lactation as needed  Goal: Incisions, wounds, or drain sites healing without S/S of infection  Description: INTERVENTIONS  1. Assess and document risk factors for skin breakdown  2. Assess and document skin  integrity  3. Assess and document dressing/incision, wound bed, drain sites and surrounding tissue  4. Implement wound care per orders  Outcome: Progressing  Flowsheets (Taken 1/6/2021 4881)  Incision(s), wound(s) or drain site(s) healing without S/S of infection: Assess and document dressing/incision, wound bed, drain sites and surrounding tissue

## 2021-01-06 NOTE — DISCHARGE SUMMARY
Discharge Summary    BRIEF OVERVIEW  Admission Date: 2021     Discharge Date: 2021    Admission Diagnosis  Term breech    Discharge Diagnosis  Same    Procedures  1. Primary  , Low Transverse  via Low Transverse  incision.   2. None     Hospital Course  Admitted at 39 3/7 weeks for primary section for breech. Failed external version. Boy 8#2. Unremarkable procedure and postop course    Results (Last 24 Hours)  No results found for this or any previous visit (from the past 24 hour(s)).    Diet   regular diet    Discharge Medications     Medication List      START taking these medications    docusate sodium 100 mg capsule  Commonly known as: COLACE  Take 1 capsule (100 mg total) by mouth 2 (two) times a day for 10 days        ferrous sulfate 325 mg (65 mg iron) tablet  Take 1 tablet (325 mg total) by mouth 2 (two) times a day with meals        HYDROcodone-acetaminophen 5-325 mg per tablet  Commonly known as: NORCO  Take 1 tablet by mouth every 4 (four) hours as needed (pain) for up to 3 days Max Daily Amount: 6 tablets        ibuprofen 800 mg tablet  Commonly known as: ADVIL,MOTRIN  Take 1 tablet (800 mg total) by mouth every 6 (six) hours as needed (pain) for up to 10 days           CONTINUE taking these medications    Prenatal 28 mg iron- 800 mcg tablet  Generic drug: MVP-ferrous fumarate-FA               Activity/Restrictions  pelvic rest for 6 weeks, no driving for 2 weeks    Follow-up   Follow up in 2 weeks with Liban.    Discharge Plan  Precautions:   · Temp greater than 100.4  · Vaginal bleeding greater than 1 pad per hour  · Incision problems  · Foul smelling vaginal odor   · Increased pain     Condition of Patient at Discharge  Good    Time spent with patient/Coordination of care: 15 minutes  -----------------------------  Irene Louie MD  21 8:41 AM

## 2021-01-06 NOTE — PLAN OF CARE
Problem: Pain - Adult  Goal: Verbalizes/displays adequate comfort level or baseline comfort level  Description: INTERVENTIONS:  1. Encourage patient to monitor pain and request interventions  2. Assess pain using the appropriate pain scale  3. Administer analgesics based on type and severity of pain and evaluate response  4. Educate/Implement non-pharmacological measures as appropriate and evaluate response  5. Consider cultural, developmental and social influences on pain and pain management  6. Notify Provider if interventions unsuccessful or patient reports new pain  Outcome: Progressing  Flowsheets (Taken 1/5/2021 1908)  Verbalizes/displays adequate comfort level or baseline comfort level:   Assess pain using the appropriate pain scale   Encourage patient to monitor pain and request interventions     Problem: Infection - Adult  Goal: Absence of infection during hospitalization  Description: INTERVENTIONS:  1. Assess and monitor for signs and symptoms of infection  2. Monitor lab/diagnostic results  3. Monitor all insertion sites/wounds/incisions  4. Monitor secretions for changes in amount and color  5. Administer medications as ordered  6. Educate and encourage patient and family to use good hand hygiene technique  7. Identify and educate in appropriate isolation precautions for identified infection/condition  Outcome: Progressing  Flowsheets (Taken 1/5/2021 1908)  Absence of infection during hospitalization:   Monitor all insertion sites/wounds/incisions   Monitor lab/diagnostic results     Problem: Safety Adult - Fall  Goal: Free from fall injury  Description: INTERVENTIONS:    Inpatient - Please reference Cares/Safety Flowsheet under [place] Fall Risk for interventions.  Pediatrics - Please reference Peds Daily Cares/Safety Flowsheet under [place] Pediatric Fall Assessment Fall Bundle for interventions  LD/OB - Please reference OB Shift Screening Flowsheet under OB Fall Risk for interventions.  Outcome:  Progressing     Problem: Discharge Planning  Goal: Discharge to home or other facility with appropriate resources  Description: INTERVENTIONS:  1. Identify and discuss barriers to discharge with patient and caregiver.  2. Arrange for needed discharge resources and transportation as appropriate.  3. Identify discharge learning needs (meds, wound care, etc).  4. Arrange for interpreters to assist at discharge as needed.  5. Refer to  for coordinating discharge planning if the patient needs post-hospital services based on physician order or complex needs related to functional status, cognitive ability or social support system.  Outcome: Progressing  Flowsheets (Taken 1/5/2021 1908)  Discharge to home or other facility with appropriate resources: Identify discharge learning needs (meds, wound care, etc)     Problem: Knowledge Deficit  Goal: Patient/family/caregiver demonstrates understanding of disease process, treatment plan, medications, and discharge instructions  Description: INTERVENTIONS:   1. Complete learning assessment and assess knowledge base  2. Provide teaching at level of understanding   3. Provide teaching via preferred learning methods  Outcome: Progressing  Flowsheets (Taken 1/5/2021 1908)  Patient/family/caregiver demonstrates understanding of disease process, treatment plan, medications, and discharge instructions: Provide teaching at level of understanding     Problem: Potential for Compromised Skin Integrity  Goal: MOBILITY IS MAINTAINED OR IMPROVED  Description: INTERVENTIONS  1. Collaborate with interdisciplinary team and initiate plan and interventions as ordered (PT/OT)  2. Encourage ambulation  3. Up to chair for meals  4. Monitor for signs of deconditioning  Outcome: Progressing  Flowsheets (Taken 1/5/2021 1908)  Mobility is Maintained or Improved: Encourage ambulation     Problem: Postpartum  Goal: Experiences normal postpartum course  Description: INTERVENTIONS:  1. Monitor  maternal vital signs  2. Assess uterine involution  Outcome: Progressing  Flowsheets (Taken 2021)  Experiences normal postpartum course:   Monitor maternal vital signs   Assess uterine involution  Goal: Appropriate maternal -  bonding  Description: INTERVENTIONS:  1. Identify family support  2. Referral to  or  as needed  Outcome: Progressing  Flowsheets (Taken 2021)  Appropriate maternal- bonding: Identify family support  Goal: Establishment of infant feeding pattern  Description: INTERVENTIONS:  1. Assess breast/bottle feeding  2. Refer to lactation as needed  Outcome: Progressing  Flowsheets (Taken 2021)  Establishment of infant feeding pattern:   Assess breast/bottle feeding   Refer to lactation as needed  Goal: Incisions, wounds, or drain sites healing without S/S of infection  Description: INTERVENTIONS  1. Assess and document risk factors for skin breakdown  2. Assess and document skin integrity  3. Assess and document dressing/incision, wound bed, drain sites and surrounding tissue  4. Implement wound care per orders  Outcome: Progressing  Flowsheets (Taken 2021)  Incision(s), wound(s) or drain site(s) healing without S/S of infection: Assess and document skin integrity

## 2021-01-07 ENCOUNTER — TELEPHONE (OUTPATIENT)
Dept: OBSTETRICS AND GYNECOLOGY | Facility: HOSPITAL | Age: 26
End: 2021-01-07

## 2021-02-11 ENCOUNTER — HOSPITAL ENCOUNTER (EMERGENCY)
Facility: HOSPITAL | Age: 26
Discharge: HOME | End: 2021-02-11
Attending: EMERGENCY MEDICINE
Payer: COMMERCIAL

## 2021-02-11 VITALS
TEMPERATURE: 101.8 F | WEIGHT: 139.99 LBS | OXYGEN SATURATION: 98 % | DIASTOLIC BLOOD PRESSURE: 72 MMHG | SYSTOLIC BLOOD PRESSURE: 108 MMHG | HEART RATE: 108 BPM | BODY MASS INDEX: 23.9 KG/M2 | HEIGHT: 64 IN | RESPIRATION RATE: 16 BRPM

## 2021-02-11 DIAGNOSIS — R00.0 TACHYCARDIA: ICD-10-CM

## 2021-02-11 DIAGNOSIS — N61.0 MASTITIS: Primary | ICD-10-CM

## 2021-02-11 DIAGNOSIS — R52 BODY ACHES: ICD-10-CM

## 2021-02-11 DIAGNOSIS — R50.9 FEVER: ICD-10-CM

## 2021-02-11 LAB — LACTATE BLDV-SCNC: 0.76 MMOL/L (ref 0.5–1.99)

## 2021-02-11 PROCEDURE — 83605 ASSAY OF LACTIC ACID: CPT | Performed by: EMERGENCY MEDICINE

## 2021-02-11 PROCEDURE — 99283 EMERGENCY DEPT VISIT LOW MDM: CPT | Performed by: EMERGENCY MEDICINE

## 2021-02-11 PROCEDURE — 36415 COLL VENOUS BLD VENIPUNCTURE: CPT | Performed by: EMERGENCY MEDICINE

## 2021-02-11 RX ORDER — HYDROCODONE BITARTRATE AND ACETAMINOPHEN 5; 325 MG/1; MG/1
1 TABLET ORAL EVERY 6 HOURS PRN
Qty: 16 TABLET | Refills: 0 | Status: ON HOLD | OUTPATIENT
Start: 2021-02-11 | End: 2023-02-11

## 2021-02-11 RX ORDER — SODIUM CHLORIDE 0.9 % (FLUSH) 0.9 %
3 SYRINGE (ML) INJECTION AS NEEDED
Status: DISCONTINUED | OUTPATIENT
Start: 2021-02-11 | End: 2021-02-11 | Stop reason: HOSPADM

## 2021-02-11 RX ORDER — DICLOXACILLIN SODIUM 500 MG/1
500 CAPSULE ORAL 4 TIMES DAILY
Qty: 40 CAPSULE | Refills: 0 | Status: SHIPPED | OUTPATIENT
Start: 2021-02-11 | End: 2021-02-21

## 2021-02-11 RX ORDER — NAPROXEN 500 MG/1
500 TABLET ORAL 2 TIMES DAILY WITH MEALS
Qty: 10 TABLET | Refills: 0 | Status: SHIPPED | OUTPATIENT
Start: 2021-02-11 | End: 2022-02-11

## 2021-02-12 NOTE — ED PROVIDER NOTES
Fever (awoke this morning with fevers,body aches and chills. has noticed her breasts have been sore the last few days. she is breast feeding.)        HPI:    Patient is a 25 y.o. female presenting to the emergency department with a constant fever which started this morning. She has taken Ibuprofen with no improvement. She reports associated body aches and chills. She denies any cough or sore throat. She woke up with her symptoms this morning.    Patient is currently breastfeeding but has also noticed her breasts have been more sore than usual for the past few days. She had similar symptoms a few weeks ago. She was not seen at that time but her symptoms resolved on their own after 5 days. Today her breast pain is worse compared to this previous episode.    History reviewed. No pertinent past medical history.    Past Surgical History:   Procedure Laterality Date   •  SECTION, LOW TRANSVERSE N/A 2021    Procedure:  SECTION;  Surgeon: Irene Louie MD;  Location: [facility] L&D [facility];  Service: Obstetrics;  Laterality: N/A;   • WISDOM TOOTH EXTRACTION         Social History     Socioeconomic History   • Marital status:      Spouse name: Not on file   • Number of children: Not on file   • Years of education: Not on file   • Highest education level: Not on file   Occupational History   • Not on file   Tobacco Use   • Smoking status: Never Smoker   • Smokeless tobacco: Never Used   Substance and Sexual Activity   • Alcohol use: Not Currently   • Drug use: Never   • Sexual activity: Defer   Other Topics Concern   • Not on file   Social History Narrative   • Not on file     Social Determinants of Health     Financial Resource Strain:    • Difficulty of Paying Living Expenses:    Food Insecurity:    • Worried About Running Out of Food in the Last Year:    • Ran Out of Food in the Last Year:    Transportation Needs:    • Lack of Transportation (Medical):    • Lack of Transportation (Non-Medical):     Physical Activity:    • Days of Exercise per Week:    • Minutes of Exercise per Session:    Stress:    • Feeling of Stress :    Social Connections:    • Frequency of Communication with Friends and Family:    • Frequency of Social Gatherings with Friends and Family:    • Attends [employer] Services:    • Active Member of Clubs or Organizations:    • Attends Club or Organization Meetings:    • Marital Status:    Intimate Partner Violence:    • Fear of Current or Ex-Partner:    • Emotionally Abused:    • Physically Abused:    • Sexually Abused:        Family History   Problem Relation Age of Onset   • Diabetes Maternal Grandmother        No Known Allergies      Current Outpatient Medications:   •  naproxen (NAPROSYN) 500 mg tablet, Take 1 tablet (500 mg total) by mouth 2 (two) times a day with meals, Disp: 10 tablet, Rfl: 0  •  HYDROcodone-acetaminophen (NORCO) 5-325 mg per tablet, Take 1 tablet by mouth every 6 (six) hours as needed (Pain) Max Daily Amount: 4 tablets, Disp: 16 tablet, Rfl: 0  •  dicloxacillin (DYNAPEN) 500 mg capsule, Take 1 capsule (500 mg total) by mouth 4 (four) times a day for 10 days, Disp: 40 capsule, Rfl: 0  •  ferrous sulfate 325 mg (65 mg iron) tablet, Take 1 tablet (325 mg total) by mouth 2 (two) times a day with meals, Disp: 60 tablet, Rfl: 0  •  MVP-ferrous fumarate-FA (Prenatal) 28 mg iron- 800 mcg tablet, Take 1 tablet by mouth daily, Disp: , Rfl:       ROS:  Constitutional: positive for fever, positive for chills, positive for body aches.  Eyes: negative for visual changes  ENT: negative for sore throat  Cardiovascular: negative for chest pain  Respiratory: negative for shortness of breath   GI: negative for abdominal pain  : negative for hematuria  Musculoskeletal: negative for back pain, positive for breast pain.  Neuro: negative for headache  Hematology: negative for bleeding  Immunology: negative for joint pain      ED Triage Vitals   Temp Heart Rate Resp BP SpO2   02/11/21 1657  02/11/21 1657 02/11/21 1657 02/11/21 1657 02/11/21 1657   (!) 38.8 °C (101.8 °F) (!) 133 16 112/72 97 %      Temp Source Heart Rate Source Patient Position BP Location FiO2 (%)   02/11/21 1657 -- 02/11/21 1730 -- --   Oral  Head of bed 30 degrees or higher           Physical Exam:  Nursing note and vitals reviewed.  Constitutional: Nontoxic appearing pleasant young female.  Head: Normocephalic and atraumatic. Mucous membranes are moist.   Eyes: Pupils are equal, round, and reactive to light.   Neck: Supple.  Cardiovascular: Tachycardic.  Pulmonary/Chest: No respiratory distress.  Clear to auscultation bilaterally. Normal respiratory excursion. Erythema, warmth, and tenderness on the medial portions of bilateral breasts.  Musculoskeletal: No edema  Neurological: Alert.   Skin: Skin is warm and dry. No rash noted.   Psychiatric: Normal mood and affect.        Labs:  Labs Reviewed   POCT LACTIC ACID (LACTATE) VENOUS - Normal       Result Value    POC Lactate 0.76     POCT LACTIC ACID (LACTATE)    Narrative:     The following orders were created for panel order POCT lactic acid (lactate) Blood, Venous.  Procedure                               Abnormality         Status                     ---------                               -----------         ------                     POCT lactic acid (lactate...[[id number]]  Normal              Final result                 Please view results for these tests on the individual orders.       Imaging:  No orders to display       MDM:    This is a 24 yo female presenting with fevers and breast tenderness. Patient has history of breast feeding. Exam is most consistent with bilateral mastitis. Patient otherwise appears nontoxic. She was given Naprosyn for fever while here. Patient is tachycardic which believe is related to fever. She will be discharged with prescription for Dicloxacillin along with additional Naproxen and Hydrocodone if needed. Reasons to return to the emergency  department were discussed at length.    Given   Medications   sodium chloride flush 3 mL (has no administration in time range)         Procedure    Procedures        Clinical Impression:    Final diagnoses:   [N61.0] Mastitis   [R50.9] Fever   [R52] Body aches   [R00.0] Tachycardia   [Z39.1] Mother currently breast-feeding         By signing my name, I, Franky Pérez, attest that this documentation has been prepared under the direction and in the presence of Dr. Helms, 2/11/2021, 5:07 PM.    I, Charanjit Helms MD, personally performed the services described in this documentation as transcribed by an emergency department scribe, in my presence, and it is both accurate and complete.      Charanjit Helms MD  02/11/21 5085

## 2021-03-12 DIAGNOSIS — Z23 HIGH PRIORITY FOR 2019-NCOV VACCINE: ICD-10-CM

## 2021-03-15 ENCOUNTER — CLINICAL SUPPORT (OUTPATIENT)
Dept: FAMILY MEDICINE | Facility: CLINIC | Age: 26
End: 2021-03-15

## 2021-03-15 DIAGNOSIS — Z23 HIGH PRIORITY FOR 2019-NCOV VACCINE: ICD-10-CM

## 2021-03-15 DIAGNOSIS — Z23 ENCOUNTER FOR VACCINATION: Primary | ICD-10-CM

## 2021-04-05 ENCOUNTER — CLINICAL SUPPORT (OUTPATIENT)
Dept: FAMILY MEDICINE | Facility: CLINIC | Age: 26
End: 2021-04-05

## 2021-04-05 DIAGNOSIS — Z23 ENCOUNTER FOR VACCINATION: Primary | ICD-10-CM

## 2023-02-11 ENCOUNTER — HOSPITAL ENCOUNTER (INPATIENT)
Facility: HOSPITAL | Age: 28
LOS: 2 days | Discharge: HOME | End: 2023-02-13
Attending: OBSTETRICS & GYNECOLOGY | Admitting: OBSTETRICS & GYNECOLOGY
Payer: COMMERCIAL

## 2023-02-11 ENCOUNTER — HOSPITAL ENCOUNTER (OUTPATIENT)
Facility: HOSPITAL | Age: 28
Discharge: HOME | End: 2023-02-11
Attending: OBSTETRICS & GYNECOLOGY | Admitting: OBSTETRICS & GYNECOLOGY
Payer: COMMERCIAL

## 2023-02-11 VITALS
WEIGHT: 152.12 LBS | DIASTOLIC BLOOD PRESSURE: 75 MMHG | SYSTOLIC BLOOD PRESSURE: 114 MMHG | BODY MASS INDEX: 25.97 KG/M2 | HEART RATE: 59 BPM | TEMPERATURE: 98.2 F | HEIGHT: 64 IN | OXYGEN SATURATION: 98 % | RESPIRATION RATE: 20 BRPM

## 2023-02-11 DIAGNOSIS — O34.219 VBAC, DELIVERED: Primary | ICD-10-CM

## 2023-02-11 PROCEDURE — (BLANK) HC ROOM SEMI PRIVATE OBSTETRICS

## 2023-02-11 PROCEDURE — G0463 HOSPITAL OUTPT CLINIC VISIT: HCPCS

## 2023-02-11 PROCEDURE — (BLANK) HC ROOM PRIVATE OBSTETRICS

## 2023-02-11 RX ORDER — FENTANYL CITRATE/PF 50 MCG/ML
100 PLASTIC BAG, INJECTION (ML) INTRAVENOUS ONCE
Status: COMPLETED | OUTPATIENT
Start: 2023-02-12 | End: 2023-02-12

## 2023-02-11 RX ORDER — SODIUM CHLORIDE, SODIUM LACTATE, POTASSIUM CHLORIDE, AND CALCIUM CHLORIDE .6; .31; .03; .02 G/100ML; G/100ML; G/100ML; G/100ML
1000 INJECTION, SOLUTION INTRAVENOUS ONCE
Status: COMPLETED | OUTPATIENT
Start: 2023-02-12 | End: 2023-02-12

## 2023-02-11 RX ORDER — DIPHENHYDRAMINE HYDROCHLORIDE 50 MG/ML
25 INJECTION INTRAMUSCULAR; INTRAVENOUS EVERY 6 HOURS PRN
Status: DISCONTINUED | OUTPATIENT
Start: 2023-02-11 | End: 2023-02-12 | Stop reason: HOSPADM

## 2023-02-11 RX ORDER — FENTANYL CITRATE/PF 50 MCG/ML
50 PLASTIC BAG, INJECTION (ML) INTRAVENOUS
Status: DISCONTINUED | OUTPATIENT
Start: 2023-02-11 | End: 2023-02-12 | Stop reason: HOSPADM

## 2023-02-11 RX ORDER — NALOXONE HYDROCHLORIDE 0.4 MG/ML
0.2 INJECTION, SOLUTION INTRAMUSCULAR; INTRAVENOUS; SUBCUTANEOUS AS NEEDED
Status: DISCONTINUED | OUTPATIENT
Start: 2023-02-11 | End: 2023-02-12 | Stop reason: HOSPADM

## 2023-02-11 RX ORDER — LIDOCAINE HYDROCHLORIDE 10 MG/ML
30 INJECTION, SOLUTION EPIDURAL; INFILTRATION; INTRACAUDAL; PERINEURAL ONCE
Status: DISCONTINUED | OUTPATIENT
Start: 2023-02-12 | End: 2023-02-12 | Stop reason: HOSPADM

## 2023-02-11 RX ORDER — ONDANSETRON HYDROCHLORIDE 2 MG/ML
4 INJECTION, SOLUTION INTRAVENOUS EVERY 6 HOURS PRN
Status: DISCONTINUED | OUTPATIENT
Start: 2023-02-11 | End: 2023-02-12 | Stop reason: HOSPADM

## 2023-02-11 RX ORDER — CALCIUM CARBONATE 200(500)MG
500 TABLET,CHEWABLE ORAL
Status: DISCONTINUED | OUTPATIENT
Start: 2023-02-11 | End: 2023-02-12 | Stop reason: HOSPADM

## 2023-02-11 RX ORDER — ACETAMINOPHEN 500 MG
500-1000 TABLET ORAL EVERY 6 HOURS PRN
Status: DISCONTINUED | OUTPATIENT
Start: 2023-02-11 | End: 2023-02-12 | Stop reason: HOSPADM

## 2023-02-11 RX ORDER — FENTANYL CITRATE/PF 50 MCG/ML
100 PLASTIC BAG, INJECTION (ML) INTRAVENOUS
Status: DISCONTINUED | OUTPATIENT
Start: 2023-02-11 | End: 2023-02-12 | Stop reason: HOSPADM

## 2023-02-11 RX ORDER — FENTANYL/ROPIVACAINE/NS/PF 2MCG/ML-.2
PLASTIC BAG, INJECTION (ML) INJECTION CONTINUOUS
Status: DISCONTINUED | OUTPATIENT
Start: 2023-02-12 | End: 2023-02-13 | Stop reason: HOSPADM

## 2023-02-11 RX ORDER — SODIUM CHLORIDE, SODIUM LACTATE, POTASSIUM CHLORIDE, CALCIUM CHLORIDE 600; 310; 30; 20 MG/100ML; MG/100ML; MG/100ML; MG/100ML
125 INJECTION, SOLUTION INTRAVENOUS CONTINUOUS
Status: DISCONTINUED | OUTPATIENT
Start: 2023-02-12 | End: 2023-02-13 | Stop reason: HOSPADM

## 2023-02-11 ASSESSMENT — ACTIVITIES OF DAILY LIVING (ADL)
ASSISTIVE_DEVICE: CONTACTS;EYEGLASSES
ADEQUATE_TO_COMPLETE_ADL: YES
PATIENT'S MEMORY ADEQUATE TO SAFELY COMPLETE DAILY ACTIVITIES?: YES

## 2023-02-11 NOTE — DISCHARGE INSTRUCTIONS
You should return to the hospital or contact your provider for any of the following:    You have noticed a decrease in your baby's activity level, less than 5 movements in one hour after 24 weeks.    You are bleeding from the vagina. You will have a small amount of bloody discharge after a vaginal exam. This is normal.    You have a gush of fluid or are leaking fluid.    Foul smelling vaginal discharge.    Regular, painful, uterine contractions, lasting 45-60 seconds, every 3-4 minutes if you are more than 37 weeks.    If less than 37 weeks and having 4 or more contractions per hour.     Severe headache, blurred vision, spots in front of your eyes.    Sudden weight gain or swelling in your face, hands, or feet.    Persistent discomfort across your upper abdomen.    Burning when you urinate.  Increased frequency or urgent sensation to urinate.    You have a temperature of 100.5 degrees Fahrenheit or greater.    Nausea or vomiting for more than 24 hours and unable to keep down fluids.    Your abdomen becomes hard and does not relax.    Severe abdominal pain.    If you experience any abdominal trauma.          The [place] [facility]aternal Mental Health Hotline provides 24/7, free, confidential support, resources and referrals to any pregnant and postpartum mothers facing mental health challenges and their loved ones. The service is available via phone and text in English or [place]an.    Call or text, 1-398-7-NYWF3RGNJ ([phone]) to connect with counselors at the [facility] Mental Health Hotline.

## 2023-02-12 ENCOUNTER — ANESTHESIA (OUTPATIENT)
Dept: OBSTETRICS AND GYNECOLOGY | Facility: HOSPITAL | Age: 28
End: 2023-02-12
Payer: COMMERCIAL

## 2023-02-12 ENCOUNTER — ANESTHESIA EVENT (OUTPATIENT)
Dept: OBSTETRICS AND GYNECOLOGY | Facility: HOSPITAL | Age: 28
End: 2023-02-12
Payer: COMMERCIAL

## 2023-02-12 LAB
ABO GROUP (TYPE) IN BLOOD: NORMAL
ANTIBODY SCREEN: NORMAL
D AG BLD QL: NORMAL
ERYTHROCYTE [DISTWIDTH] IN BLOOD BY AUTOMATED COUNT: 13.4 % (ref 11.5–14)
HCT VFR BLD AUTO: 39.6 % (ref 34–45)
HGB BLD-MCNC: 13.8 G/DL (ref 11.5–15.5)
MCH RBC QN AUTO: 31.6 PG (ref 28–33)
MCHC RBC AUTO-ENTMCNC: 34.7 G/DL (ref 32–36)
MCV RBC AUTO: 90.9 FL (ref 81–97)
PLATELET # BLD AUTO: 242 10*3/UL (ref 140–350)
PMV BLD AUTO: 9.1 FL (ref 6.9–10.8)
RBC # BLD AUTO: 4.36 10*6/ΜL (ref 3.7–5.3)
T PALLIDUM AB SER QL IA: NEGATIVE
WBC # BLD AUTO: 18.2 10*3/UL (ref 4.5–10.5)

## 2023-02-12 PROCEDURE — 86780 TREPONEMA PALLIDUM: CPT | Performed by: OBSTETRICS & GYNECOLOGY

## 2023-02-12 PROCEDURE — 86900 BLOOD TYPING SEROLOGIC ABO: CPT

## 2023-02-12 PROCEDURE — 7200000400 HC RECOVERY CARE VAGINAL DELIVERY 1ST 2 HOURS

## 2023-02-12 PROCEDURE — 6370000100 HC RX 637 (ALT 250 FOR IP): Performed by: OBSTETRICS & GYNECOLOGY

## 2023-02-12 PROCEDURE — 2590000100 HC RX 259: Performed by: OBSTETRICS & GYNECOLOGY

## 2023-02-12 PROCEDURE — 59409 OBSTETRICAL CARE: CPT

## 2023-02-12 PROCEDURE — 2500000200 HC RX 250 WO HCPCS: Performed by: STUDENT IN AN ORGANIZED HEALTH CARE EDUCATION/TRAINING PROGRAM

## 2023-02-12 PROCEDURE — 7210000200 HC COMPLEX LABOR PER HOUR

## 2023-02-12 PROCEDURE — 6360000200 HC RX 636 W HCPCS (ALT 250 FOR IP): Performed by: STUDENT IN AN ORGANIZED HEALTH CARE EDUCATION/TRAINING PROGRAM

## 2023-02-12 PROCEDURE — 62326 NJX INTERLAMINAR LMBR/SAC: CPT

## 2023-02-12 PROCEDURE — C1755 CATHETER, INTRASPINAL: HCPCS | Performed by: STUDENT IN AN ORGANIZED HEALTH CARE EDUCATION/TRAINING PROGRAM

## 2023-02-12 PROCEDURE — 85027 COMPLETE CBC AUTOMATED: CPT | Performed by: OBSTETRICS & GYNECOLOGY

## 2023-02-12 PROCEDURE — 51798 US URINE CAPACITY MEASURE: CPT

## 2023-02-12 PROCEDURE — 0DQR0ZZ REPAIR ANAL SPHINCTER, OPEN APPROACH: ICD-10-PCS | Performed by: OBSTETRICS & GYNECOLOGY

## 2023-02-12 PROCEDURE — 00HU33Z INSERTION OF INFUSION DEVICE INTO SPINAL CANAL, PERCUTANEOUS APPROACH: ICD-10-PCS | Performed by: STUDENT IN AN ORGANIZED HEALTH CARE EDUCATION/TRAINING PROGRAM

## 2023-02-12 PROCEDURE — 6360000200 HC RX 636 W HCPCS (ALT 250 FOR IP): Performed by: OBSTETRICS & GYNECOLOGY

## 2023-02-12 PROCEDURE — (BLANK) HC ROOM PRIVATE OBSTETRICS

## 2023-02-12 PROCEDURE — 2580000300 HC RX 258: Performed by: OBSTETRICS & GYNECOLOGY

## 2023-02-12 PROCEDURE — 2580000300 HC RX 258: Performed by: STUDENT IN AN ORGANIZED HEALTH CARE EDUCATION/TRAINING PROGRAM

## 2023-02-12 RX ORDER — DOCUSATE SODIUM 100 MG/1
100 CAPSULE, LIQUID FILLED ORAL 2 TIMES DAILY
Status: DISCONTINUED | OUTPATIENT
Start: 2023-02-12 | End: 2023-02-13 | Stop reason: HOSPADM

## 2023-02-12 RX ORDER — FENTANYL CITRATE/PF 50 MCG/ML
PLASTIC BAG, INJECTION (ML) INTRAVENOUS
Status: COMPLETED | OUTPATIENT
Start: 2023-02-12 | End: 2023-02-12

## 2023-02-12 RX ORDER — OXYTOCIN/0.9 % SODIUM CHLORIDE 30/500 ML
999 PLASTIC BAG, INJECTION (ML) INTRAVENOUS
Status: DISCONTINUED | OUTPATIENT
Start: 2023-02-12 | End: 2023-02-13 | Stop reason: HOSPADM

## 2023-02-12 RX ORDER — OXYTOCIN/0.9 % SODIUM CHLORIDE 30/500 ML
125-999 PLASTIC BAG, INJECTION (ML) INTRAVENOUS CONTINUOUS
Status: DISCONTINUED | OUTPATIENT
Start: 2023-02-12 | End: 2023-02-13 | Stop reason: HOSPADM

## 2023-02-12 RX ORDER — IBUPROFEN 800 MG/1
800 TABLET ORAL EVERY 8 HOURS SCHEDULED
Status: DISCONTINUED | OUTPATIENT
Start: 2023-02-12 | End: 2023-02-13 | Stop reason: HOSPADM

## 2023-02-12 RX ORDER — ACETAMINOPHEN 500 MG
500-1000 TABLET ORAL EVERY 4 HOURS PRN
Status: DISCONTINUED | OUTPATIENT
Start: 2023-02-12 | End: 2023-02-13 | Stop reason: HOSPADM

## 2023-02-12 RX ORDER — CALCIUM CARBONATE 200(500)MG
750 TABLET,CHEWABLE ORAL 3 TIMES DAILY PRN
Status: DISCONTINUED | OUTPATIENT
Start: 2023-02-12 | End: 2023-02-13 | Stop reason: HOSPADM

## 2023-02-12 RX ORDER — FOLIC ACID/MULTIVIT,IRON,MINER 0.4MG-18MG
1 TABLET ORAL DAILY
Status: DISCONTINUED | OUTPATIENT
Start: 2023-02-12 | End: 2023-02-13 | Stop reason: HOSPADM

## 2023-02-12 RX ADMIN — FENTANYL CITRATE 100 MCG: 50 INJECTION, SOLUTION INTRAMUSCULAR; INTRAVENOUS at 00:34

## 2023-02-12 RX ADMIN — SODIUM CHLORIDE, POTASSIUM CHLORIDE, SODIUM LACTATE AND CALCIUM CHLORIDE 1000 ML: 600; 310; 30; 20 INJECTION, SOLUTION INTRAVENOUS at 00:20

## 2023-02-12 RX ADMIN — IBUPROFEN 800 MG: 800 TABLET, FILM COATED ORAL at 18:11

## 2023-02-12 RX ADMIN — BENZOCAINE AND LEVOMENTHOL 1 SPRAY: 200; 5 SPRAY TOPICAL at 10:31

## 2023-02-12 RX ADMIN — DOCUSATE SODIUM 100 MG: 100 CAPSULE, LIQUID FILLED ORAL at 19:27

## 2023-02-12 RX ADMIN — Medication 1000 MG: at 14:43

## 2023-02-12 RX ADMIN — IBUPROFEN 800 MG: 800 TABLET, FILM COATED ORAL at 10:30

## 2023-02-12 RX ADMIN — FENTANYL CITRATE 100 MCG: 50 INJECTION, SOLUTION INTRAMUSCULAR; INTRAVENOUS at 01:04

## 2023-02-12 RX ADMIN — Medication 1 TABLET: at 10:33

## 2023-02-12 RX ADMIN — Medication 1000 MG: at 23:54

## 2023-02-12 RX ADMIN — DOCUSATE SODIUM 100 MG: 100 CAPSULE, LIQUID FILLED ORAL at 10:33

## 2023-02-12 RX ADMIN — Medication: at 01:09

## 2023-02-12 RX ADMIN — SODIUM CHLORIDE, POTASSIUM CHLORIDE, SODIUM LACTATE AND CALCIUM CHLORIDE 125 ML/HR: 600; 310; 30; 20 INJECTION, SOLUTION INTRAVENOUS at 05:03

## 2023-02-12 RX ADMIN — OXYTOCIN-SODIUM CHLORIDE 0.9% IV SOLN 30 UNIT/500ML 999 MILLI-UNITS/MIN: 30-0.9/5 SOLUTION at 09:06

## 2023-02-12 RX ADMIN — ONDANSETRON 4 MG: 2 INJECTION INTRAMUSCULAR; INTRAVENOUS at 07:11

## 2023-02-12 RX ADMIN — SODIUM CHLORIDE, POTASSIUM CHLORIDE, SODIUM LACTATE AND CALCIUM CHLORIDE 125 ML/HR: 600; 310; 30; 20 INJECTION, SOLUTION INTRAVENOUS at 00:48

## 2023-02-12 RX ADMIN — Medication 1000 MG: at 10:30

## 2023-02-12 NOTE — H&P
History and Physical    Chief complaint: Contractions    HPI  Magy Marin is a 27 y.o. female with an Estimated Date of Delivery: 23. She is currently a 38w1d  who is being admitted for labor management, and TOLAC . Her current pregnancy is significant for no complications. Patient reports  contractions beginning yesterday morning. Was seen in L&D triage yesterday morning with contractions and discharged home with labor precautions. Returned last night after having made cervical change. Currently comfortable with epidural.  Fetal Movement: normal. Previous pregnancies complicated by  c section for breech .     OB History    Para Term  AB Living   2 1 1     1   SAB IAB Ectopic Molar Multiple Live Births           0 1      # Outcome Date GA Lbr Jeff/2nd Weight Sex Delivery Anes PTL Lv   2 Current            1 Term 21 39w3d  3706 g M CS-LTranv Spinal N ULICES     Past Medical History:   Diagnosis Date    Allergic      Past Surgical History:   Procedure Laterality Date     SECTION, LOW TRANSVERSE N/A 2021    Procedure:  SECTION;  Surgeon: Irene Louie MD;  Location: [facility] L&D [facility];  Service: Obstetrics;  Laterality: N/A;    WISDOM TOOTH EXTRACTION       Social History      Flowsheet Row Most Recent Value   Living Arrangements Minor children, Spouse/significant other   Support Systems --   Type of Residence Private residence          Family History   Problem Relation Age of Onset    Cancer Maternal Grandfather     Diabetes Paternal Grandmother     Rheum arthritis Neg Hx     Osteoarthritis Neg Hx     Asthma Neg Hx     Breast cancer Neg Hx     Heart failure Neg Hx     Hyperlipidemia Neg Hx     Hypertension Neg Hx     Migraines Neg Hx     Ovarian cancer Neg Hx     Seizures Neg Hx     Rashes / Skin problems Neg Hx     Stroke Neg Hx     Thyroid disease Neg Hx     Bladder Cancer Neg Hx      No Known Allergies  Medications Prior to Admission   Medication Sig     MVP-ferrous fumarate-FA (NATAFORT) 28 mg iron- 800 mcg tablet Take 1 tablet by mouth daily       Review of Systems:  Pertinent items are noted in HPI.    Objective     Vital signs:  Vitals:    02/12/23 0630   BP: 100/64   Pulse: 105   Resp:    Temp:    SpO2: 98%       Maternal Type & Screen       Maternal Type & Screen (MH Results)       Test Value Date Time    ABO (MH Results)  B  02/12/23 0022    Rh (MH Results)  POS  02/12/23 0022    Antibody Screen (MH Results)  NEG  02/12/23 0022              Legend    ^: Historical                          Maternal Type & Screen       Maternal Type & Screen (External Results)       Test Value Date Time    ABO (External Results) ^ B  07/28/22     Rh (External Results) ^ Positive  07/28/22     Antibody screen (External Results) ^ Negative  07/28/22               Legend    ^: Historical                          Prenatal Results       Maternal Prenatal Labs       Test Value Date Time    Gonorrhea  ^ Negative  07/30/22     Chlamydia  ^ Negative  07/30/22     Syphilis Ab  Negative  02/12/23 0022      ^ Negative  11/17/22       ^ Negative  07/28/22     RPR        Rubella ^ Reactive-Presumed Immune  07/28/22     HBsAg  ^ Nonreactive  07/28/22     Hep C Ab ^ Nonreactive  07/28/22     HIV ^ Nonreactive  07/28/22     GBS ^ Negative  02/01/23               Legend    ^: Historical                          Physical Exam:  General:   Alert, oriented, no acute distress   Skin: Normal   Lungs:  Clear to auscultation bilaterally   Heart:  Regular rate and rhythm   Abdomen:  Gravid, non-tender   Extremities:  No edema, non-tender   Pelvis/Cervix:  Complete. Vertex. AROM clear   Uterine Size:  size equals dates     EFM: 150s and reactive  [place]: q 4 min    Assessment/Plan      Active Problems:  No Active Problems: There are no active problems currently on the Problem List. Please update the Problem List and refresh.    38 1/7 week IUP. TOLAC. Comfortable with epidural, now complete. Baby is a  little high so will wait for some descent prior to pushing and prior to augmenting with pitocin  15 minutes

## 2023-02-12 NOTE — ANESTHESIA PREPROCEDURE EVALUATION
"Pre-Procedure Assessment    Patient: Magy Marin, female, 27 y.o.    Ht Readings from Last 1 Encounters:   02/11/23 1.626 m (5' 4\")     Wt Readings from Last 1 Encounters:   02/11/23 68.9 kg (152 lb)       Last Vitals  BP      Temp      Pulse     Resp      SpO2      Pain Score         Problem list reviewed and Medical history reviewed           Airway   Mallampati: II  TM distance: >3 FB  Neck ROM: full      Dental      Pulmonary - negative ROS    breath sounds clear to auscultation  Cardiovascular - negative ROS    Rhythm: regular  Rate: normal    Mental Status/Neuro/Psych - negative ROS   Pt is alert.        GI/Hepatic/Renal - negative ROS     Endo/Other - negative ROS   Abdominal           Social History     Tobacco Use   • Smoking status: Never   • Smokeless tobacco: Never   Substance Use Topics   • Alcohol use: Not Currently      Hematology   WBC   Date Value Ref Range Status   02/12/2023 18.2 (H) 4.5 - 10.5 10*3/uL Final     RBC   Date Value Ref Range Status   02/12/2023 4.36 3.70 - 5.30 10*6/µL Final     MCV   Date Value Ref Range Status   02/12/2023 90.9 81.0 - 97.0 fL Final     Hemoglobin   Date Value Ref Range Status   02/12/2023 13.8 11.5 - 15.5 g/dL Final     Hematocrit   Date Value Ref Range Status   02/12/2023 39.6 34.0 - 45.0 % Final     Platelets   Date Value Ref Range Status   02/12/2023 242 140 - 350 10*3/uL Final      Coagulation No results found for: PT, APTT, INR   General Chemistry No results found for: POCGLU, CALCIUM, BUN, CREATININE, GLUCOSE, NA, K, MG, CO2, CL, DIGOXIN  Anesthesia Plan    ASA 2   NPO status reviewed: > 6 hours    Epidural                          Anesthetic plan and risks discussed with patient.                "

## 2023-02-12 NOTE — L&D DELIVERY NOTE
Delivery Note    Obstetrician:   Irene Louie    Pre-Delivery Diagnosis: 38 week IUP for TOLAC in labor    Post-Delivery Diagnosis: Same. Extensive perineal laceration    Procedure: Successful  and repair of perineal laceration    Infant Wt: pending        Apgars: 1' 9  /  5' 9     Placenta and Cord:          Mechanism: spontaneous        Description:  complete, 3 vessel cord    Estimated Blood Loss:   1000            Specimens: none           Complications:  perineal laceration           Condition: stable    Disposition:recovery    Summary: Admitted at 38 weeks in labor for TOLAC. Epidural. AROM clear. Pt progressed to complete with good descent and Valsalva x 2 contractions.  boy pending, Apgars 9,9. Spont delivery of placenta with three vessel cord. Pt had complete avulsion of hymenal ring and distal vagina from perineal body and soft tissue deep to bilateral labia majora. Brisk bleeding occurred from the lacerations. Probably had a partial third degree, hard to tell as sphincter was small and edematous. Similarly, the transverse perineal muscle  and retracted into deep perineal tissue on pts right. This was all carefully reapproximated, avulsion of vaginal tissue reanchored to it's proper introital/perineal attachment. Transverse perineal muscle reapproximated, and sphincter reinforced. Remainder of closure was similar to a second degree repair.

## 2023-02-12 NOTE — NURSING END OF SHIFT
Nursing End of Shift Summary    Goals:  Clinical Goals for the Shift: admit to labor and delivery, progress toward vaginal delivery    Narrative Summary of Progress Towards Clinical Goals:  Pt remains comfortable with epidural, has progressed to 9 cm without augmentation.     Barriers for Transfer or Discharge: no  SBAR to BEULAH Gallagher RN.

## 2023-02-12 NOTE — PLAN OF CARE
Problem: Pain - Adult  Goal: Verbalizes/displays adequate comfort level or baseline comfort level  Description: INTERVENTIONS:  1. Encourage patient to monitor pain and request interventions  2. Assess pain using the appropriate pain scale  3. Administer analgesics based on type and severity of pain and evaluate response  4. Educate/Implement non-pharmacological measures as appropriate and evaluate response  5. Consider cultural, developmental and social influences on pain and pain management  6. Notify Provider if interventions unsuccessful or patient reports new pain  Outcome: Progressing  Flowsheets (Taken 2/12/2023 0213)  Verbalizes/displays adequate comfort level or baseline comfort level:   Encourage patient to monitor pain and request interventions   Assess pain using the appropriate pain scale   Administer analgesics based on type and severity of pain and evaluate response   Educate/Implement non-pharmacological measures as appropriate and evaluate response   Consider cultural, developmental and social influences on pain and pain management   Notify Provider if interventions unsuccessful or patient reports new pain     Problem: Infection - Adult  Goal: Absence of infection during hospitalization  Description: INTERVENTIONS:  1. Assess and monitor for signs and symptoms of infection  2. Monitor lab/diagnostic results  3. Monitor all insertion sites/wounds/incisions  4. Monitor secretions for changes in amount and color  5. Administer medications as ordered  6. Educate and encourage patient and family to use good hand hygiene technique  7. Identify and educate in appropriate isolation precautions for identified infection/condition  Outcome: Progressing  Flowsheets (Taken 2/12/2023 0213)  Absence of infection during hospitalization:   Assess and monitor for signs and symptoms of infection   Monitor lab/diagnostic results   Monitor all insertion sites/wounds/incisions   Monitor secretions for changes in amount  and colo   Administer medications as ordered   Educate and encourage patient and family to use good hand hygiene technique     Problem: Safety Adult - Fall  Goal: Free from fall injury  Description: INTERVENTIONS:    Inpatient - Please reference Cares/Safety Flowsheet under [place] Fall Risk for interventions.  Pediatrics - Please reference Peds Daily Cares/Safety Flowsheet under [facility] Pediatric Fall Assessment Fall Bundle for interventions  LD/OB - Please reference OB Shift Screening Flowsheet under OB Fall Risk for interventions.  Outcome: Progressing     Problem: Discharge Planning  Goal: Discharge to home or other facility with appropriate resources  Description: INTERVENTIONS:  1. Identify and discuss barriers to discharge with patient and caregiver.  2. Arrange for needed discharge resources and transportation as appropriate.  3. Identify discharge learning needs (meds, wound care, etc).  4. Arrange for interpreters to assist at discharge as needed.  5. Refer to  for coordinating discharge planning if the patient needs post-hospital services based on physician order or complex needs related to functional status, cognitive ability or social support system.  Outcome: Progressing  Flowsheets (Taken 2023)  Discharge to home or other facility with appropriate resources: Identify discharge learning needs (meds, wound care, etc)     Problem: Vaginal Birth or  Section  Goal: Fetal and maternal status remain reassuring during the birth process  Description: INTERVENTIONS:  1. Monitor vital signs  2. Monitor fetal heart rate  3. Monitor uterine activity  4. Monitor labor progression (vaginal delivery)  5. DVT prophylaxis (C/S delivery)  6. Surgical antibiotic prophylaxis (C/S delivery)  Outcome: Progressing  Flowsheets (Taken 2023)  Fetal and maternal status remain reassuring during the birth process:   Monitor vital signs   Monitor uterine activity   Monitor fetal heart  rate   Monitor labor progression (Vaginal delivery)

## 2023-02-12 NOTE — ANESTHESIA PROCEDURE NOTES
Epidural Block    Patient location during procedure: OB  End time: 2/12/2023 1:04 AM  Reason for block: labor epidural    Staffing  Anesthesiologist: Hugh Ortiz MD  Performed: anesthesiologist   Preanesthetic Checklist  Completed: patient identified, IV checked, site marked, risks and benefits discussed, surgical consent, monitors and equipment checked, pre-op evaluation and timeout performed  Epidural  Patient position: sitting  Prep: ChloraPrep  Patient monitoring: continuous pulse oximetry and blood pressure monitoring  Approach: midline  Location: L4-L5  Injection technique: DARIUSZ saline  Procedure: negative aspiration for heme, no paresthesia on injection and negative aspiration for CSF  Local infiltration: lidocaine 1%  Needle  Needle type: Tuohy   Needle gauge: 17 G  Needle insertion depth: 6 cm  Catheter type: side hole  Catheter size: 19 G  Catheter at skin depth: 11 cm  Test dose: negative and lidocaine 1.5% with epinephrine 1-to-200,000  Test dose volume: 3 mL  Catheter secured with: tape, Mastisol and Tegaderm  Medications Administered  fentaNYL (PF) (SUBLIMAZE) injection 50 mcg/mL - Epidural - epidural   100 mcg - 2/12/2023 1:04:00 AM  Assessment  Events: well tolerated

## 2023-02-12 NOTE — PLAN OF CARE
Problem: Pain - Adult  Goal: Verbalizes/displays adequate comfort level or baseline comfort level  Description: INTERVENTIONS:  1. Encourage patient to monitor pain and request interventions  2. Assess pain using the appropriate pain scale  3. Administer analgesics based on type and severity of pain and evaluate response  4. Educate/Implement non-pharmacological measures as appropriate and evaluate response  5. Consider cultural, developmental and social influences on pain and pain management  6. Notify Provider if interventions unsuccessful or patient reports new pain  Outcome: Progressing  Flowsheets (Taken 2/12/2023 1432)  Verbalizes/displays adequate comfort level or baseline comfort level:   Encourage patient to monitor pain and request interventions   Assess pain using the appropriate pain scale   Administer analgesics based on type and severity of pain and evaluate response   Educate/Implement non-pharmacological measures as appropriate and evaluate response   Consider cultural, developmental and social influences on pain and pain management   Notify Provider if interventions unsuccessful or patient reports new pain     Problem: Infection - Adult  Goal: Absence of infection during hospitalization  Description: INTERVENTIONS:  1. Assess and monitor for signs and symptoms of infection  2. Monitor lab/diagnostic results  3. Monitor all insertion sites/wounds/incisions  4. Monitor secretions for changes in amount and color  5. Administer medications as ordered  6. Educate and encourage patient and family to use good hand hygiene technique  7. Identify and educate in appropriate isolation precautions for identified infection/condition  Outcome: Progressing  Flowsheets (Taken 2/12/2023 1432)  Absence of infection during hospitalization:   Assess and monitor for signs and symptoms of infection   Monitor lab/diagnostic results   Monitor all insertion sites/wounds/incisions   Educate and encourage patient and family  to use good hand hygiene technique     Problem: Safety Adult - Fall  Goal: Free from fall injury  Description: INTERVENTIONS:    Inpatient - Please reference Cares/Safety Flowsheet under [place] Fall Risk for interventions.  Pediatrics - Please reference Peds Daily Cares/Safety Flowsheet under [facility] Pediatric Fall Assessment Fall Bundle for interventions  LD/OB - Please reference OB Shift Screening Flowsheet under OB Fall Risk for interventions.  Outcome: Progressing     Problem: Discharge Planning  Goal: Discharge to home or other facility with appropriate resources  Description: INTERVENTIONS:  1. Identify and discuss barriers to discharge with patient and caregiver.  2. Arrange for needed discharge resources and transportation as appropriate.  3. Identify discharge learning needs (meds, wound care, etc).  4. Arrange for interpreters to assist at discharge as needed.  5. Refer to  for coordinating discharge planning if the patient needs post-hospital services based on physician order or complex needs related to functional status, cognitive ability or social support system.  Outcome: Progressing  Flowsheets (Taken 2/12/2023 1432)  Discharge to home or other facility with appropriate resources:   Identify and discuss barriers to discharge with patient and caregiver   Identify discharge learning needs (meds, wound care, etc)     Problem: Knowledge Deficit  Goal: Patient/family/caregiver demonstrates understanding of disease process, treatment plan, medications, and discharge instructions  Description: INTERVENTIONS:   1. Complete learning assessment and assess knowledge base  2. Provide teaching at level of understanding   3. Provide teaching via preferred learning methods  Outcome: Progressing  Flowsheets (Taken 2/12/2023 1432)  Patient/family/caregiver demonstrates understanding of disease process, treatment plan, medications, and discharge instructions:   Complete learning assessment and assess  knowledge base   Provide teaching at level of understanding   Provide teaching via preferred learning methods     Problem: Potential for Compromised Skin Integrity  Goal: Skin Integrity is Maintained or Improved  Description: INTERVENTIONS:  1. Assess and monitor skin integrity  2. Collaborate with interdisciplinary team and initiate plans and interventions as needed  3. Alternate a full bath with partial baths for elderly   4. Monitor patient's hygiene practices   5. Collaborate with wound, ostomy, and continence nurse  Outcome: Progressing  Flowsheets (Taken 2/12/2023 7322)  Skin integrity is maintained or improved: Assess and monitor skin integrity

## 2023-02-12 NOTE — PLAN OF CARE
Problem: Vaginal Birth or  Section  Goal: Fetal and maternal status remain reassuring during the birth process  Description: INTERVENTIONS:  1. Monitor vital signs  2. Monitor fetal heart rate  3. Monitor uterine activity  4. Monitor labor progression (vaginal delivery)  5. DVT prophylaxis (C/S delivery)  6. Surgical antibiotic prophylaxis (C/S delivery)  2023 113 by Pina Gallahger RN  Outcome: Completed  2023 113 by Pina Gallagher RN  Outcome: Progressing  Flowsheets (Taken 2023 0213 by Kimberly Gramajo RN)  Fetal and maternal status remain reassuring during the birth process:   Monitor vital signs   Monitor uterine activity   Monitor fetal heart rate   Monitor labor progression (Vaginal delivery)

## 2023-02-12 NOTE — PLAN OF CARE
Problem: Pain - Adult  Goal: Verbalizes/displays adequate comfort level or baseline comfort level  Description: INTERVENTIONS:  1. Encourage patient to monitor pain and request interventions  2. Assess pain using the appropriate pain scale  3. Administer analgesics based on type and severity of pain and evaluate response  4. Educate/Implement non-pharmacological measures as appropriate and evaluate response  5. Consider cultural, developmental and social influences on pain and pain management  6. Notify Provider if interventions unsuccessful or patient reports new pain  Outcome: Progressing  Flowsheets (Taken 2/12/2023 0213 by Kimberly Gramajo RN)  Verbalizes/displays adequate comfort level or baseline comfort level:   Encourage patient to monitor pain and request interventions   Assess pain using the appropriate pain scale   Administer analgesics based on type and severity of pain and evaluate response   Educate/Implement non-pharmacological measures as appropriate and evaluate response   Consider cultural, developmental and social influences on pain and pain management   Notify Provider if interventions unsuccessful or patient reports new pain     Problem: Infection - Adult  Goal: Absence of infection during hospitalization  Description: INTERVENTIONS:  1. Assess and monitor for signs and symptoms of infection  2. Monitor lab/diagnostic results  3. Monitor all insertion sites/wounds/incisions  4. Monitor secretions for changes in amount and color  5. Administer medications as ordered  6. Educate and encourage patient and family to use good hand hygiene technique  7. Identify and educate in appropriate isolation precautions for identified infection/condition  Outcome: Progressing  Flowsheets (Taken 2/12/2023 0213 by Kimberly Gramajo RN)  Absence of infection during hospitalization:   Assess and monitor for signs and symptoms of infection   Monitor lab/diagnostic results   Monitor all insertion  sites/wounds/incisions   Monitor secretions for changes in amount and colo   Administer medications as ordered   Educate and encourage patient and family to use good hand hygiene technique     Problem: Safety Adult - Fall  Goal: Free from fall injury  Description: INTERVENTIONS:    Inpatient - Please reference Cares/Safety Flowsheet under [place] Fall Risk for interventions.  Pediatrics - Please reference Peds Daily Cares/Safety Flowsheet under [facility] Pediatric Fall Assessment Fall Bundle for interventions  LD/OB - Please reference OB Shift Screening Flowsheet under OB Fall Risk for interventions.  Outcome: Progressing  Note: Call bell within reach, side rails up x2, call light within reach     Problem: Vaginal Birth or  Section  Goal: Fetal and maternal status remain reassuring during the birth process  Description: INTERVENTIONS:  1. Monitor vital signs  2. Monitor fetal heart rate  3. Monitor uterine activity  4. Monitor labor progression (vaginal delivery)  5. DVT prophylaxis (C/S delivery)  6. Surgical antibiotic prophylaxis (C/S delivery)  Outcome: Progressing  Flowsheets (Taken 2023 by Kimberly Gramajo RN)  Fetal and maternal status remain reassuring during the birth process:   Monitor vital signs   Monitor uterine activity   Monitor fetal heart rate   Monitor labor progression (Vaginal delivery)

## 2023-02-12 NOTE — NURSING END OF SHIFT
Nursing End of Shift Summary    Goals:  Clinical Goals for the Shift: admit to labor and delivery, progress toward vaginal delivery    Narrative Summary of Progress Towards Clinical Goals:  Vaginal delivery    Barriers for Transfer or Discharge: yes  SBAR to KEATON Ritter RN

## 2023-02-13 VITALS
RESPIRATION RATE: 20 BRPM | DIASTOLIC BLOOD PRESSURE: 63 MMHG | BODY MASS INDEX: 25.95 KG/M2 | HEART RATE: 81 BPM | OXYGEN SATURATION: 98 % | SYSTOLIC BLOOD PRESSURE: 106 MMHG | TEMPERATURE: 97.7 F | WEIGHT: 152 LBS | HEIGHT: 64 IN

## 2023-02-13 PROBLEM — O34.219 VBAC, DELIVERED: Status: ACTIVE | Noted: 2023-02-13

## 2023-02-13 LAB
ERYTHROCYTE [DISTWIDTH] IN BLOOD BY AUTOMATED COUNT: 13.7 % (ref 11.5–14)
HCT VFR BLD AUTO: 26.4 % (ref 34–45)
HGB BLD-MCNC: 9.1 G/DL (ref 11.5–15.5)
MCH RBC QN AUTO: 32.2 PG (ref 28–33)
MCHC RBC AUTO-ENTMCNC: 34.4 G/DL (ref 32–36)
MCV RBC AUTO: 93.7 FL (ref 81–97)
PLATELET # BLD AUTO: 183 10*3/UL (ref 140–350)
PMV BLD AUTO: 8.9 FL (ref 6.9–10.8)
RBC # BLD AUTO: 2.82 10*6/ΜL (ref 3.7–5.3)
WBC # BLD AUTO: 13.1 10*3/UL (ref 4.5–10.5)

## 2023-02-13 PROCEDURE — 85027 COMPLETE CBC AUTOMATED: CPT | Performed by: OBSTETRICS & GYNECOLOGY

## 2023-02-13 PROCEDURE — 6360000200 HC RX 636 W HCPCS (ALT 250 FOR IP): Performed by: OBSTETRICS & GYNECOLOGY

## 2023-02-13 PROCEDURE — 2590000100 HC RX 259: Performed by: OBSTETRICS & GYNECOLOGY

## 2023-02-13 PROCEDURE — 6370000100 HC RX 637 (ALT 250 FOR IP): Performed by: OBSTETRICS & GYNECOLOGY

## 2023-02-13 RX ORDER — DOCUSATE SODIUM 100 MG/1
100 CAPSULE, LIQUID FILLED ORAL 2 TIMES DAILY
Qty: 20 CAPSULE | Refills: 0 | Status: SHIPPED | OUTPATIENT
Start: 2023-02-13 | End: 2023-02-13 | Stop reason: SDUPTHER

## 2023-02-13 RX ORDER — IBUPROFEN 800 MG/1
800 TABLET ORAL EVERY 6 HOURS PRN
Qty: 15 TABLET | Refills: 0 | Status: SHIPPED | OUTPATIENT
Start: 2023-02-13 | End: 2023-02-13

## 2023-02-13 RX ORDER — DOCUSATE SODIUM 100 MG/1
100 CAPSULE, LIQUID FILLED ORAL 2 TIMES DAILY
Qty: 20 CAPSULE | Refills: 0 | Status: SHIPPED | OUTPATIENT
Start: 2023-02-13 | End: 2023-02-23

## 2023-02-13 RX ADMIN — DOCUSATE SODIUM 100 MG: 100 CAPSULE, LIQUID FILLED ORAL at 09:19

## 2023-02-13 RX ADMIN — IBUPROFEN 800 MG: 800 TABLET, FILM COATED ORAL at 11:35

## 2023-02-13 RX ADMIN — IBUPROFEN 800 MG: 800 TABLET, FILM COATED ORAL at 03:27

## 2023-02-13 RX ADMIN — Medication 1 TABLET: at 09:19

## 2023-02-13 RX ADMIN — IRON SUCROSE 200 MG: 20 INJECTION, SOLUTION INTRAVENOUS at 09:19

## 2023-02-13 NOTE — DISCHARGE SUMMARY
Discharge Summary    BRIEF OVERVIEW  Admission Date: 2023     Discharge Date: 2023    Admission Diagnosis  38 week IUP for TOLAC in labor    Discharge Diagnosis  Same    Procedures  1.   Vaginal, Spontaneous  via   incision.   2. None     Hospital Course  Admitted for TOLAC in labor.  boy 6#2. Extensive perineal laceration. Breastfeeding at discharge    Results (Last 24 Hours)  Recent Results (from the past 24 hour(s))   CBC Blood, Venous    Collection Time: 23  4:17 AM   Result Value Ref Range    WBC 13.1 (H) 4.5 - 10.5 10*3/uL    RBC 2.82 (L) 3.70 - 5.30 10*6/µL    Hemoglobin 9.1 (L) 11.5 - 15.5 g/dL    Hematocrit 26.4 (L) 34.0 - 45.0 %    MCV 93.7 81.0 - 97.0 fL    MCH 32.2 28.0 - 33.0 pg    MCHC 34.4 32.0 - 36.0 g/dL    RDW 13.7 11.5 - 14.0 %    Platelets 183 140 - 350 10*3/uL    MPV 8.9 6.9 - 10.8 fL       Diet   regular diet    Discharge Medications     Medication List        START taking these medications      docusate sodium 100 mg capsule  Commonly known as: COLACE  Take 1 capsule (100 mg total) by mouth 2 (two) times a day for 10 days        ibuprofen 800 mg tablet  Commonly known as: ADVIL,MOTRIN  Take 1 tablet (800 mg total) by mouth every 6 (six) hours as needed (pain) for up to 7 days               CONTINUE taking these medications      MVP-ferrous fumarate-FA 28 mg iron- 800 mcg tablet  Commonly known as: NATAFORT                 Activity/Restrictions  pelvic rest for 6 weeks    Follow-up   Follow up in 6 week with marbella.    Discharge Plan  Precautions:   Temp greater than 100.4  Vaginal bleeding greater than 1 pad per hour  Incision problems  Foul smelling vaginal odor   Increased pain     Condition of Patient at Discharge  Good    Time spent with patient/Coordination of care: 15 minutes

## 2023-02-13 NOTE — DISCHARGE INSTRUCTIONS
Postpartum Discharge Instructions  Breast-Feeding  Feed your baby on demand.  Breastfeeding every 1 ½ -3 hours is normal. You create more milk by breastfeeding more often  Drink water when thirsty.  Eat a balanced diet.  Most things you eat and drink enter the breast milk including caffeine, alcohol, and medicines.  The most common cause of pain with breastfeeding is because the baby is not latched on right.  See the Celebrate the Family Book, for more information.  Call [facility] services [phone] if you have any breastfeeding questions or concerns.  Breast-milk may be stored in a refrigerator for up to 3 days and in a freezer for 3-6 months.  Do not use a microwave to thaw frozen milk.  Using warm water for thawing is the best way.  Call your provider if you have red, tender areas on your breast along with a fever.      Bottle-Feeding  Breast Care:  Wear a comfortable, supportive bra.  Use pain medicines (Acetaminophen) as needed to relieve swelling and pressure.  Ice packs may be put on the breasts 4 times a day for 15 minutes each time for comfort. Avoid breast stimulation such as hot showers directly on the breasts or expressing milk.  Rest and relax when the baby sleeps, at least 2 times a day  Do not heat formula in the microwave ---it may cause severe burns  Feed the baby on demand.   Hold the baby in your arms when feeding the baby.  After every ½ to 1 ounce or more the baby drinks, burp the baby.  Never leave the baby with a propped bottle.  If your baby does not finish the whole bottle, throw the unused formula away.      Episiotomy/Perineal Care  Watch for increased soreness, drainage from episiotomy and stitches coming apart.  Soak in a warm tub 2-3 times a day until you longer feel pain.  Use good hand washing at all times.  Wipe perineal area from front to back to prevent infection  No tampons, swimming, hot tubs, or douching for 4-6 weeks  Use medicines as directed for  constipation      Sexual Activity  No sex for 6 weeks  When you resume sex, use water-soluble jelly (K-Y) or contraceptive cream if you notice a need for more vaginal lubrication  Breast-feeding is not a form of birth control.  When you resume sex, foams and condoms are a good form of birth control and prevent sexually transmitted diseases, when used right.  Follow your providers suggestions for birth control.          Diet  Eat a well-balanced diet; choose from all food groups for each meal (milk or dairy, meat, fruits, vegetables, grains.)      When to call your provider  Heavy or bright red bleeding (more than 1 pad per hour) not related to increased activity.  Change in bleeding back to dark red clots.  Bad smell to the vaginal discharge  If you have pain, burning, trouble, or frequency with urination  Lower back pain  Chills, fever above 100.4 degrees F, Feeling like you have the flu, or increased stomach tenderness or pain  Pain, redness, swelling, increased warmth in calf or any part of the leg.  Sadness or blues lasting longer than 2 weeks or thoughts of hurting yourself or the baby  Maternal Mental Health Hotline. The hotline is accessible by phone or text at 1-051-4-AHBA1OUHF (1-209.223.5335) in English or Monegasque

## 2023-02-13 NOTE — PLAN OF CARE
Problem: Pain - Adult  Goal: Verbalizes/displays adequate comfort level or baseline comfort level  Description: INTERVENTIONS:  1. Encourage patient to monitor pain and request interventions  2. Assess pain using the appropriate pain scale  3. Administer analgesics based on type and severity of pain and evaluate response  4. Educate/Implement non-pharmacological measures as appropriate and evaluate response  5. Consider cultural, developmental and social influences on pain and pain management  6. Notify Provider if interventions unsuccessful or patient reports new pain  Outcome: Progressing  Flowsheets (Taken 2/12/2023 2159)  Verbalizes/displays adequate comfort level or baseline comfort level:   Encourage patient to monitor pain and request interventions   Assess pain using the appropriate pain scale   Administer analgesics based on type and severity of pain and evaluate response   Educate/Implement non-pharmacological measures as appropriate and evaluate response   Consider cultural, developmental and social influences on pain and pain management   Notify Provider if interventions unsuccessful or patient reports new pain     Problem: Infection - Adult  Goal: Absence of infection during hospitalization  Description: INTERVENTIONS:  1. Assess and monitor for signs and symptoms of infection  2. Monitor lab/diagnostic results  3. Monitor all insertion sites/wounds/incisions  4. Monitor secretions for changes in amount and color  5. Administer medications as ordered  6. Educate and encourage patient and family to use good hand hygiene technique  7. Identify and educate in appropriate isolation precautions for identified infection/condition  Outcome: Progressing  Flowsheets (Taken 2/12/2023 2159)  Absence of infection during hospitalization:   Assess and monitor for signs and symptoms of infection   Monitor lab/diagnostic results   Monitor secretions for changes in amount and colo   Administer medications as ordered      Problem: Safety Adult - Fall  Goal: Free from fall injury  Description: INTERVENTIONS:    Inpatient - Please reference Cares/Safety Flowsheet under [place] Fall Risk for interventions.  Pediatrics - Please reference Peds Daily Cares/Safety Flowsheet under [facility] Pediatric Fall Assessment Fall Bundle for interventions  LD/OB - Please reference OB Shift Screening Flowsheet under OB Fall Risk for interventions.  Outcome: Progressing     Problem: Discharge Planning  Goal: Discharge to home or other facility with appropriate resources  Description: INTERVENTIONS:  1. Identify and discuss barriers to discharge with patient and caregiver.  2. Arrange for needed discharge resources and transportation as appropriate.  3. Identify discharge learning needs (meds, wound care, etc).  4. Arrange for interpreters to assist at discharge as needed.  5. Refer to  for coordinating discharge planning if the patient needs post-hospital services based on physician order or complex needs related to functional status, cognitive ability or social support system.  Outcome: Progressing  Flowsheets (Taken 2023)  Discharge to home or other facility with appropriate resources: Identify and discuss barriers to discharge with patient and caregiver     Problem: Postpartum  Goal: Experiences normal postpartum course  Description: INTERVENTIONS:  1. Monitor maternal vital signs  2. Assess uterine involution  Outcome: Progressing  Flowsheets (Taken 2023)  Experiences normal postpartum course:   Monitor maternal vital signs   Assess uterine involution  Goal: Appropriate maternal -  bonding  Description: INTERVENTIONS:  1. Identify family support  2. Referral to  or  as needed  Outcome: Progressing  Flowsheets (Taken 2023)  Appropriate maternal- bonding: Identify family support  Goal: Establishment of infant feeding pattern  Description: INTERVENTIONS:  1. Assess  breast/bottle feeding  2. Refer to lactation as needed  Outcome: Progressing  Flowsheets (Taken 2/12/2023 2159)  Establishment of infant feeding pattern:   Assess breast/bottle feeding   Refer to lactation as needed  Goal: Incisions, wounds, or drain sites healing without S/S of infection  Description: INTERVENTIONS  1. Assess and document risk factors for skin breakdown  2. Assess and document skin integrity  3. Assess and document dressing/incision, wound bed, drain sites and surrounding tissue  4. Implement wound care per orders  Outcome: Progressing  Flowsheets (Taken 2/12/2023 2159)  Incision(s), wound(s) or drain site(s) healing without S/S of infection: Assess and document skin integrity

## 2023-02-13 NOTE — PLAN OF CARE
Problem: Pain - Adult  Goal: Verbalizes/displays adequate comfort level or baseline comfort level  Description: INTERVENTIONS:  1. Encourage patient to monitor pain and request interventions  2. Assess pain using the appropriate pain scale  3. Administer analgesics based on type and severity of pain and evaluate response  4. Educate/Implement non-pharmacological measures as appropriate and evaluate response  5. Consider cultural, developmental and social influences on pain and pain management  6. Notify Provider if interventions unsuccessful or patient reports new pain  Outcome: Completed

## 2023-02-13 NOTE — LACTATION NOTE
This note was copied from a baby's chart.  Lactation Consult    Reason for Consult: Initial assessment (maternal request)    Mother's Name: Magy Marin     SUBJECTIVE/OBJECTIVE  : 2023  Gestational Age: 38w1d AGA infant.   Feeding Narrative: LC to room per mother's request to discuss progress with feeding.  Assisted with optimizing positioning and latch as well as provided education/anticipatory guidance.    Type of delivery: Vaginal, Spontaneous    Birth weight 6 lb 2 oz (2779 g)   Weight change since birth Pct Wt Change: 0 %   Current/Previous Weight Patient Weights for the past 8760 hrs (Last 2 readings):   Weight   23 0900 2779 g      Weight Change  Since Last Visit: 2779 g       Maternal history includes:  27 y.o.      Patient Active Problem List    Diagnosis Date Noted    , delivered 2023     delivery delivered 2021    Breech presentation 2021      Past Medical History:   Diagnosis Date    Allergic        Past Surgical History:   Procedure Laterality Date     SECTION, LOW TRANSVERSE N/A 2021    Procedure:  SECTION;  Surgeon: Irene Louie MD;  Location: [facility] L&D OR;  Service: Obstetrics;  Laterality: N/A;    WISDOM TOOTH EXTRACTION        Social History     Socioeconomic History    Marital status:    Tobacco Use    Smoking status: Never    Smokeless tobacco: Never   Substance and Sexual Activity    Alcohol use: Not Currently    Drug use: Never    Sexual activity: Defer      Has mother  before?: Yes  How long did the the mother previously breastfeed?:  last child x 13 months         ASSESSMENT  Left Breast: WDL Right Breast: WDL Left Nipple: WDL Right Nipple: WDL          Pre-Consult Assessment   Feeding Frequency: Q1-3H  Feeding Duration: 10-30 min  Latch Assessment: Correct  Latch Pain: 0  Supplementation: No    Post-Consult Assessment  Feeding Duration: 10 min L / 10 min R.  Assisted with  asymmetrical latch to L side and observed 10 min  Latch Assessment: Correct, Audible Swallowing, Sustained  Latch Pain: 0  Supplementation: No  Interventions: Reposition Latch at Breast          PLAN  Continue exclusive breastfeeding.  General breastfeeding education provided and informed of outpatient LCS.  Will continue to follow PRN.     -----------------------------------------------  Amber Meléndez RN  02/13/23 10:21 AM

## 2024-05-22 LAB
ABO GROUP BLD: NORMAL
D AG BLD QL: POSITIVE
EXTERNAL ANTIBODY SCREEN: NEGATIVE
EXTERNAL CHLAMYDIA TRACHOMATIS DNA PROBE: NEGATIVE
EXTERNAL HEPATITIS B SURFACE ANTIGEN: NONREACTIVE
EXTERNAL HEPATITIS C ANTIBODY: NONREACTIVE
EXTERNAL HIV 1+2 AB/P24 AG SERUM: NONREACTIVE
EXTERNAL NEISSERIA GONORRHOEAE DNA PROBE: NEGATIVE
EXTERNAL RUBELLA ANTIBODY, IGG: NORMAL
EXTERNAL SYPHILIS RPR SCREEN: NORMAL

## 2024-10-10 LAB — EXTERNAL GROUP B STREP DNA: POSITIVE

## 2024-11-04 ENCOUNTER — HOSPITAL ENCOUNTER (INPATIENT)
Facility: HOSPITAL | Age: 29
LOS: 2 days | Discharge: HOME | End: 2024-11-06
Attending: STUDENT IN AN ORGANIZED HEALTH CARE EDUCATION/TRAINING PROGRAM | Admitting: STUDENT IN AN ORGANIZED HEALTH CARE EDUCATION/TRAINING PROGRAM
Payer: COMMERCIAL

## 2024-11-04 ENCOUNTER — ANESTHESIA (OUTPATIENT)
Dept: OBSTETRICS AND GYNECOLOGY | Facility: HOSPITAL | Age: 29
End: 2024-11-04
Payer: COMMERCIAL

## 2024-11-04 ENCOUNTER — ANESTHESIA EVENT (OUTPATIENT)
Dept: OBSTETRICS AND GYNECOLOGY | Facility: HOSPITAL | Age: 29
End: 2024-11-04
Payer: COMMERCIAL

## 2024-11-04 DIAGNOSIS — O47.9 UTERINE CONTRACTIONS: Primary | ICD-10-CM

## 2024-11-04 PROBLEM — Z98.891 HISTORY OF CESAREAN DELIVERY: Status: ACTIVE | Noted: 2024-11-04

## 2024-11-04 LAB
ABO GROUP (TYPE) IN BLOOD: NORMAL
ANTIBODY SCREEN: NORMAL
D AG BLD QL: NORMAL
ERYTHROCYTE [DISTWIDTH] IN BLOOD BY AUTOMATED COUNT: 13.8 % (ref 11.5–14)
HCT VFR BLD AUTO: 42.2 % (ref 34–45)
HGB BLD-MCNC: 13.9 G/DL (ref 11.5–15.5)
MCH RBC QN AUTO: 31.5 PG (ref 28–33)
MCHC RBC AUTO-ENTMCNC: 33 G/DL (ref 32–36)
MCV RBC AUTO: 95.4 FL (ref 81–97)
PLATELET # BLD AUTO: 208 10*3/UL (ref 140–350)
PMV BLD AUTO: 9.8 FL (ref 6.9–10.8)
RBC # BLD AUTO: 4.42 10*6/UL (ref 3.7–5.3)
T PALLIDUM AB SER QL IA: NEGATIVE
WBC # BLD AUTO: 12 10*3/UL (ref 4.5–10.5)

## 2024-11-04 PROCEDURE — 2580000300 HC RX 258: Performed by: STUDENT IN AN ORGANIZED HEALTH CARE EDUCATION/TRAINING PROGRAM

## 2024-11-04 PROCEDURE — 2500000200 HC RX 250 WO HCPCS: Performed by: ANESTHESIOLOGY

## 2024-11-04 PROCEDURE — 62326 NJX INTERLAMINAR LMBR/SAC: CPT

## 2024-11-04 PROCEDURE — 86780 TREPONEMA PALLIDUM: CPT | Performed by: STUDENT IN AN ORGANIZED HEALTH CARE EDUCATION/TRAINING PROGRAM

## 2024-11-04 PROCEDURE — 7210000100 HC STANDARD LABOR PER HOUR

## 2024-11-04 PROCEDURE — 6360000200 HC RX 636 W HCPCS (ALT 250 FOR IP): Performed by: STUDENT IN AN ORGANIZED HEALTH CARE EDUCATION/TRAINING PROGRAM

## 2024-11-04 PROCEDURE — 10907ZC DRAINAGE OF AMNIOTIC FLUID, THERAPEUTIC FROM PRODUCTS OF CONCEPTION, VIA NATURAL OR ARTIFICIAL OPENING: ICD-10-PCS | Performed by: STUDENT IN AN ORGANIZED HEALTH CARE EDUCATION/TRAINING PROGRAM

## 2024-11-04 PROCEDURE — 0HQ9XZZ REPAIR PERINEUM SKIN, EXTERNAL APPROACH: ICD-10-PCS | Performed by: STUDENT IN AN ORGANIZED HEALTH CARE EDUCATION/TRAINING PROGRAM

## 2024-11-04 PROCEDURE — 85027 COMPLETE CBC AUTOMATED: CPT | Performed by: STUDENT IN AN ORGANIZED HEALTH CARE EDUCATION/TRAINING PROGRAM

## 2024-11-04 PROCEDURE — 2580000300 HC RX 258: Performed by: ANESTHESIOLOGY

## 2024-11-04 PROCEDURE — 7200000400 HC RECOVERY CARE VAGINAL DELIVERY 1ST 2 HOURS: Performed by: STUDENT IN AN ORGANIZED HEALTH CARE EDUCATION/TRAINING PROGRAM

## 2024-11-04 PROCEDURE — 99281 EMR DPT VST MAYX REQ PHY/QHP: CPT | Performed by: STUDENT IN AN ORGANIZED HEALTH CARE EDUCATION/TRAINING PROGRAM

## 2024-11-04 PROCEDURE — 86885 COOMBS TEST INDIRECT QUAL: CPT | Performed by: STUDENT IN AN ORGANIZED HEALTH CARE EDUCATION/TRAINING PROGRAM

## 2024-11-04 PROCEDURE — (BLANK) HC ROOM PRIVATE OBSTETRICS

## 2024-11-04 PROCEDURE — 00HU33Z INSERTION OF INFUSION DEVICE INTO SPINAL CANAL, PERCUTANEOUS APPROACH: ICD-10-PCS | Performed by: ANESTHESIOLOGY

## 2024-11-04 PROCEDURE — 59020 FETAL CONTRACT STRESS TEST: CPT

## 2024-11-04 PROCEDURE — 59409 OBSTETRICAL CARE: CPT | Performed by: STUDENT IN AN ORGANIZED HEALTH CARE EDUCATION/TRAINING PROGRAM

## 2024-11-04 PROCEDURE — C1755 CATHETER, INTRASPINAL: HCPCS | Performed by: ANESTHESIOLOGY

## 2024-11-04 PROCEDURE — 6360000200 HC RX 636 W HCPCS (ALT 250 FOR IP): Performed by: ANESTHESIOLOGY

## 2024-11-04 RX ORDER — FENTANYL CITRATE/PF 50 MCG/ML
50 PLASTIC BAG, INJECTION (ML) INTRAVENOUS
Status: DISCONTINUED | OUTPATIENT
Start: 2024-11-04 | End: 2024-11-04

## 2024-11-04 RX ORDER — LIDOCAINE HYDROCHLORIDE AND EPINEPHRINE 15; 5 MG/ML; UG/ML
INJECTION, SOLUTION EPIDURAL AS NEEDED
Status: DISCONTINUED | OUTPATIENT
Start: 2024-11-04 | End: 2024-11-04 | Stop reason: SURG

## 2024-11-04 RX ORDER — OXYTOCIN 10 [USP'U]/ML
10 INJECTION, SOLUTION INTRAMUSCULAR; INTRAVENOUS ONCE
Status: COMPLETED | OUTPATIENT
Start: 2024-11-04 | End: 2024-11-04

## 2024-11-04 RX ORDER — FENTANYL CITRATE/PF 50 MCG/ML
100 PLASTIC BAG, INJECTION (ML) INTRAVENOUS
Status: DISCONTINUED | OUTPATIENT
Start: 2024-11-04 | End: 2024-11-04

## 2024-11-04 RX ORDER — CALCIUM CARBONATE 200(500)MG
750 TABLET,CHEWABLE ORAL 3 TIMES DAILY PRN
Status: DISCONTINUED | OUTPATIENT
Start: 2024-11-04 | End: 2024-11-06 | Stop reason: HOSPADM

## 2024-11-04 RX ORDER — OXYTOCIN/0.9 % SODIUM CHLORIDE 30/500 ML
125-999 PLASTIC BAG, INJECTION (ML) INTRAVENOUS CONTINUOUS
Status: DISCONTINUED | OUTPATIENT
Start: 2024-11-04 | End: 2024-11-04

## 2024-11-04 RX ORDER — FENTANYL CITRATE/PF 50 MCG/ML
PLASTIC BAG, INJECTION (ML) INTRAVENOUS AS NEEDED
Status: DISCONTINUED | OUTPATIENT
Start: 2024-11-04 | End: 2024-11-04 | Stop reason: SURG

## 2024-11-04 RX ORDER — SODIUM CHLORIDE, SODIUM LACTATE, POTASSIUM CHLORIDE, AND CALCIUM CHLORIDE .6; .31; .03; .02 G/100ML; G/100ML; G/100ML; G/100ML
1000 INJECTION, SOLUTION INTRAVENOUS ONCE
Status: COMPLETED | OUTPATIENT
Start: 2024-11-04 | End: 2024-11-04

## 2024-11-04 RX ORDER — ACETAMINOPHEN 500 MG
500-1000 TABLET ORAL EVERY 6 HOURS PRN
Status: DISCONTINUED | OUTPATIENT
Start: 2024-11-04 | End: 2024-11-04

## 2024-11-04 RX ORDER — NALOXONE HYDROCHLORIDE 0.4 MG/ML
0.2 INJECTION, SOLUTION INTRAMUSCULAR; INTRAVENOUS; SUBCUTANEOUS AS NEEDED
Status: DISCONTINUED | OUTPATIENT
Start: 2024-11-04 | End: 2024-11-04

## 2024-11-04 RX ORDER — ONDANSETRON HYDROCHLORIDE 2 MG/ML
4 INJECTION, SOLUTION INTRAVENOUS EVERY 6 HOURS PRN
Status: DISCONTINUED | OUTPATIENT
Start: 2024-11-04 | End: 2024-11-04

## 2024-11-04 RX ORDER — LIDOCAINE HYDROCHLORIDE 10 MG/ML
30 INJECTION, SOLUTION EPIDURAL; INFILTRATION; INTRACAUDAL; PERINEURAL ONCE
Status: DISCONTINUED | OUTPATIENT
Start: 2024-11-04 | End: 2024-11-04

## 2024-11-04 RX ORDER — CALCIUM CARBONATE 200(500)MG
500 TABLET,CHEWABLE ORAL
Status: DISCONTINUED | OUTPATIENT
Start: 2024-11-04 | End: 2024-11-04

## 2024-11-04 RX ORDER — FENTANYL CITRATE/PF 50 MCG/ML
100 PLASTIC BAG, INJECTION (ML) INTRAVENOUS ONCE
Status: DISCONTINUED | OUTPATIENT
Start: 2024-11-04 | End: 2024-11-04

## 2024-11-04 RX ORDER — DIPHENHYDRAMINE HYDROCHLORIDE 50 MG/ML
25 INJECTION INTRAMUSCULAR; INTRAVENOUS EVERY 6 HOURS PRN
Status: DISCONTINUED | OUTPATIENT
Start: 2024-11-04 | End: 2024-11-04

## 2024-11-04 RX ORDER — DOCUSATE SODIUM 100 MG/1
100 CAPSULE, LIQUID FILLED ORAL 2 TIMES DAILY
Status: DISCONTINUED | OUTPATIENT
Start: 2024-11-04 | End: 2024-11-06 | Stop reason: HOSPADM

## 2024-11-04 RX ORDER — FOLIC ACID/MULTIVIT,IRON,MINER 0.4MG-18MG
1 TABLET ORAL DAILY
Status: DISCONTINUED | OUTPATIENT
Start: 2024-11-05 | End: 2024-11-06 | Stop reason: HOSPADM

## 2024-11-04 RX ORDER — PENICILLIN G 3000000 [IU]/50ML
3 INJECTION, SOLUTION INTRAVENOUS EVERY 4 HOURS
Status: DISCONTINUED | OUTPATIENT
Start: 2024-11-05 | End: 2024-11-04

## 2024-11-04 RX ORDER — FENTANYL/ROPIVACAINE/NS/PF 2MCG/ML-.2
PLASTIC BAG, INJECTION (ML) INJECTION CONTINUOUS
Status: DISCONTINUED | OUTPATIENT
Start: 2024-11-04 | End: 2024-11-04

## 2024-11-04 RX ORDER — ACETAMINOPHEN 500 MG
1000 TABLET ORAL EVERY 6 HOURS PRN
Status: DISCONTINUED | OUTPATIENT
Start: 2024-11-04 | End: 2024-11-06 | Stop reason: HOSPADM

## 2024-11-04 RX ORDER — IBUPROFEN 800 MG/1
800 TABLET ORAL EVERY 8 HOURS
Status: DISCONTINUED | OUTPATIENT
Start: 2024-11-04 | End: 2024-11-06 | Stop reason: HOSPADM

## 2024-11-04 RX ADMIN — SODIUM CHLORIDE 5 MILLION UNITS: 9 INJECTION, SOLUTION INTRAVENOUS at 21:18

## 2024-11-04 RX ADMIN — LIDOCAINE HYDROCHLORIDE AND EPINEPHRINE 3 ML: 15; 5 INJECTION, SOLUTION EPIDURAL at 21:41

## 2024-11-04 RX ADMIN — FENTANYL CITRATE 100 MCG: 50 INJECTION, SOLUTION INTRAMUSCULAR; INTRAVENOUS at 21:41

## 2024-11-04 RX ADMIN — OXYTOCIN 10 UNITS: 10 INJECTION INTRAVENOUS at 22:55

## 2024-11-04 RX ADMIN — SODIUM CHLORIDE, POTASSIUM CHLORIDE, SODIUM LACTATE AND CALCIUM CHLORIDE 1000 ML: 600; 310; 30; 20 INJECTION, SOLUTION INTRAVENOUS at 21:00

## 2024-11-04 RX ADMIN — Medication: at 21:47

## 2024-11-04 ASSESSMENT — ACTIVITIES OF DAILY LIVING (ADL)
ASSISTIVE_DEVICE: CONTACTS;EYEGLASSES
PATIENT'S MEMORY ADEQUATE TO SAFELY COMPLETE DAILY ACTIVITIES?: YES
ADEQUATE_TO_COMPLETE_ADL: YES

## 2024-11-05 PROCEDURE — 90471 IMMUNIZATION ADMIN: CPT | Performed by: STUDENT IN AN ORGANIZED HEALTH CARE EDUCATION/TRAINING PROGRAM

## 2024-11-05 PROCEDURE — 51798 US URINE CAPACITY MEASURE: CPT

## 2024-11-05 PROCEDURE — 6360000200 HC RX 636 W HCPCS (ALT 250 FOR IP): Performed by: STUDENT IN AN ORGANIZED HEALTH CARE EDUCATION/TRAINING PROGRAM

## 2024-11-05 PROCEDURE — 90707 MMR VACCINE SC: CPT | Performed by: STUDENT IN AN ORGANIZED HEALTH CARE EDUCATION/TRAINING PROGRAM

## 2024-11-05 PROCEDURE — 2590000100 HC RX 259: Performed by: STUDENT IN AN ORGANIZED HEALTH CARE EDUCATION/TRAINING PROGRAM

## 2024-11-05 PROCEDURE — (BLANK) HC ROOM PRIVATE OBSTETRICS

## 2024-11-05 PROCEDURE — 6370000100 HC RX 637 (ALT 250 FOR IP): Performed by: STUDENT IN AN ORGANIZED HEALTH CARE EDUCATION/TRAINING PROGRAM

## 2024-11-05 PROCEDURE — 7200000400 HC RECOVERY CARE VAGINAL DELIVERY 1ST 2 HOURS

## 2024-11-05 RX ADMIN — IBUPROFEN 800 MG: 800 TABLET, FILM COATED ORAL at 17:00

## 2024-11-05 RX ADMIN — IBUPROFEN 800 MG: 800 TABLET, FILM COATED ORAL at 00:46

## 2024-11-05 RX ADMIN — Medication 1 TABLET: at 09:02

## 2024-11-05 RX ADMIN — DOCUSATE SODIUM 100 MG: 100 CAPSULE, LIQUID FILLED ORAL at 09:02

## 2024-11-05 RX ADMIN — DOCUSATE SODIUM 100 MG: 100 CAPSULE, LIQUID FILLED ORAL at 00:46

## 2024-11-05 RX ADMIN — Medication 0.5 ML: at 12:53

## 2024-11-05 RX ADMIN — DOCUSATE SODIUM 100 MG: 100 CAPSULE, LIQUID FILLED ORAL at 20:07

## 2024-11-05 RX ADMIN — IBUPROFEN 800 MG: 800 TABLET, FILM COATED ORAL at 09:02

## 2024-11-05 NOTE — PLAN OF CARE
Problem: Vaginal Birth or  Section  Goal: Fetal and maternal status remain reassuring during the birth process  Description: INTERVENTIONS:  1. Monitor vital signs  2. Monitor fetal heart rate  3. Monitor uterine activity  4. Monitor labor progression (vaginal delivery)  5. DVT prophylaxis (C/S delivery)  6. Surgical antibiotic prophylaxis (C/S delivery)  Flowsheets (Taken 2024)  Fetal and maternal status remain reassuring during the birth process:   Monitor vital signs   Monitor uterine activity   Monitor labor progression (Vaginal delivery)   Monitor fetal heart rate     Problem: Pain - Adult  Goal: Verbalizes/displays adequate comfort level or baseline comfort level  Description: INTERVENTIONS:  1. Encourage patient to monitor pain and request interventions  2. Assess pain using the appropriate pain scale  3. Administer analgesics based on type and severity of pain and evaluate response  4. Educate/Implement non-pharmacological measures as appropriate and evaluate response  5. Consider cultural, developmental and social influences on pain and pain management  6. Notify Provider if interventions unsuccessful or patient reports new pain  Flowsheets (Taken 2024)  Verbalizes/displays adequate comfort level or baseline comfort level:   Encourage patient to monitor pain and request interventions   Assess pain using the appropriate pain scale   Administer analgesics based on type and severity of pain and evaluate response   Educate/Implement non-pharmacological measures as appropriate and evaluate response   Consider cultural, developmental and social influences on pain and pain management   Notify Provider if interventions unsuccessful or patient reports new pain     Problem: Infection - Adult  Goal: Absence of infection during hospitalization  Description: INTERVENTIONS:  1. Assess and monitor for signs and symptoms of infection  2. Monitor lab/diagnostic results  3. Monitor all insertion  sites/wounds/incisions  4. Monitor secretions for changes in amount and color  5. Administer medications as ordered  6. Educate and encourage patient and family to use good hand hygiene technique  7. Identify and educate in appropriate isolation precautions for identified infection/condition  Flowsheets (Taken 11/4/2024 2209)  Absence of infection during hospitalization:   Assess and monitor for signs and symptoms of infection   Monitor lab/diagnostic results   Monitor all insertion sites/wounds/incisions   Monitor secretions for changes in amount and color   Administer medications as ordered   Educate and encourage patient and family to use good hand hygiene technique   Identify and educate in appropriate isolation precautions for identified infection/condition     Problem: Safety Adult - Fall  Goal: Free from fall injury  Description: INTERVENTIONS:    Inpatient - Please reference Cares/Safety Flowsheet under Albarado Fall Risk for interventions.  Pediatrics - Please reference Peds Daily Cares/Safety Flowsheet under Fay Pediatric Fall Assessment Fall Bundle for interventions  LD/OB - Please reference OB Shift Screening Flowsheet under OB Fall Risk for interventions.  Note: Bed in lowest position. Call light and belongings in reach.     Problem: Discharge Planning  Goal: Discharge to home or other facility with appropriate resources  Description: INTERVENTIONS:  1. Identify and discuss barriers to discharge with patient and caregiver.  2. Arrange for needed discharge resources and transportation as appropriate.  3. Identify discharge learning needs (meds, wound care, etc).  4. Arrange for interpreters to assist at discharge as needed.  5. Refer to  for coordinating discharge planning if the patient needs post-hospital services based on physician order or complex needs related to functional status, cognitive ability or social support system.  Flowsheets (Taken 11/4/2024 2209)  Discharge to home or  other facility with appropriate resources: Identify and discuss barriers to discharge with patient and caregiver

## 2024-11-05 NOTE — ANESTHESIA PROCEDURE NOTES
Epidural Block    Patient location during procedure: OB  Start time: 11/4/2024 9:34 PM  End time: 11/4/2024 9:41 PM  Reason for block: labor epidural    Staffing  Anesthesiologist: DUY Thakur MD  Performed: anesthesiologist   Preanesthetic Checklist  Completed: patient identified, IV checked, site marked, risks and benefits discussed, surgical consent, monitors and equipment checked, pre-op evaluation and timeout performed  Epidural  Patient position: sitting  Prep: Betadine  Patient monitoring: blood pressure monitoring and continuous pulse oximetry  Approach: midline  Location: L3-L4  Injection technique: DARIUSZ saline  Procedure: negative aspiration for heme, no paresthesia on injection and negative aspiration for CSF  Local infiltration: lidocaine 1%  Needle  Needle type: Tuohy   Needle gauge: 17 G  Needle insertion depth: 5 cm  Catheter type: side hole  Catheter size: 20 G  Catheter at skin depth: 11 cm  Test dose: negative and lidocaine 1.5% with epinephrine 1-to-200,000  Test dose volume: 3 mL  Catheter secured with: tape and Neoderm    Assessment  Sensory level: T10  Events: well tolerated

## 2024-11-05 NOTE — LACTATION NOTE
This note was copied from a baby's chart.  Lactation Consult    Reason for Consult: Initial assessment (Maternal request)    Mother's Name: Magy Marin    SUBJECTIVE/OBJECTIVE  : 2024  Gestational Age: 39w5d AGA infant.   Feeding Narrative: Verbal consult only r/t maternal request; mother notes working with LCS with previouse children    Type of delivery: Vaginal, Spontaneous    Birth weight 7 lb 0.3 oz (3183 g)   Weight change since birth Pct Wt Change: 0 %   Current/Previous Weight Patient Weights for the past 8760 hrs (Last 2 readings):   Weight   24 2251 3183 g      Weight Change  Since Last Visit: 3183 g       Maternal history includes:  29 y.o.    Patient Active Problem List    Diagnosis Date Noted    Uterine contractions 2024    History of  2024    , delivered 2023     delivery delivered 2021    Breech presentation 2021     Past Medical History:   Diagnosis Date    Allergic       Past Surgical History:   Procedure Laterality Date     SECTION, LOW TRANSVERSE N/A 2021    Procedure:  SECTION;  Surgeon: Irene Louie MD;  Location: [facility] L&D OR;  Service: Obstetrics;  Laterality: N/A;    WISDOM TOOTH EXTRACTION       Social History     Socioeconomic History    Marital status:    Tobacco Use    Smoking status: Never    Smokeless tobacco: Never   Vaping Use    Vaping status: Never Used   Substance and Sexual Activity    Alcohol use: Not Currently    Drug use: Never    Sexual activity: Defer     Has mother  before?: Yes  How long did the the mother previously breastfeed?: 13 months with 2 previous children         ASSESSMENT                   Pre-Consult Assessment   Feeding Frequency: enc demand feeding ensuring 8-12 feeds per day    Post-Consult Assessment  Feeding Duration: mother has fed infant a few times since delivery. Will go home  per mom. Mother reports no pain/soreness with latch at this  time.             PLAN  Mother to continue to exclusively breastfeed.General breastfeeding and out patient information provided. Plans for LC to follow up prn.     -----------------------------------------------  Karly Quinn RN  11/05/24 10:50 AM

## 2024-11-05 NOTE — ANESTHESIA PREPROCEDURE EVALUATION
"Pre-Procedure Assessment    Patient: Magy Marin, female, 29 y.o.    Ht Readings from Last 1 Encounters:   11/04/24 1.626 m (5' 4\")     Wt Readings from Last 1 Encounters:   11/04/24 71.3 kg (157 lb 3 oz)       Last Vitals  BP      Temp      Pulse     Resp      SpO2      Pain Score         Problem list reviewed and Medical history reviewed           Airway   Mallampati: II  TM distance: >3 FB  Neck ROM: full      Dental      Pulmonary - negative ROS    breath sounds clear to auscultation  Cardiovascular - negative ROS    Rhythm: regular  Rate: normal    Mental Status/Neuro/Psych - negative ROS   Pt is alert.        GI/Hepatic/Renal - negative ROS     Endo/Other - negative ROS   Abdominal           Social History     Tobacco Use   • Smoking status: Never   • Smokeless tobacco: Never   Substance Use Topics   • Alcohol use: Not Currently      Hematology   WBC   Date Value Ref Range Status   02/13/2023 13.1 (H) 4.5 - 10.5 10*3/uL Final     RBC   Date Value Ref Range Status   02/13/2023 2.82 (L) 3.70 - 5.30 10*6/µL Final     MCV   Date Value Ref Range Status   02/13/2023 93.7 81.0 - 97.0 fL Final     Hemoglobin   Date Value Ref Range Status   02/13/2023 9.1 (L) 11.5 - 15.5 g/dL Final     Hematocrit   Date Value Ref Range Status   02/13/2023 26.4 (L) 34.0 - 45.0 % Final     Platelets   Date Value Ref Range Status   02/13/2023 183 140 - 350 10*3/uL Final      Coagulation No results found for: \"PT\", \"APTT\", \"INR\"   General Chemistry No results found for: \"POCGLU\", \"CALCIUM\", \"BUN\", \"CREATININE\", \"GLUCOSE\", \"NA\", \"K\", \"MG\", \"CO2\", \"CL\", \"DIGOXIN\"  Anesthesia Plan    ASA 2   NPO status reviewed: > 6 hours    Epidural                          Anesthetic plan and risks discussed with patient.  Use of blood products discussed with patient who consented to blood products.                "

## 2024-11-05 NOTE — H&P
History and Physical    Chief complaint: contractions    HPI  Magy Marin is a 29 y.o. female with an Estimated Date of Delivery: None noted.. She is currently a   who is being admitted for labor management. Her current pregnancy is significant for history of prior CS delivery. Patient reports contractions, denies VB/LOF, +FM. Patient is GBS+.     OB History    Para Term  AB Living   3 2 2     2   SAB IAB Ectopic Molar Multiple Live Births           0 2      # Outcome Date GA Lbr Jeff/2nd Weight Sex Type Anes PTL Lv   3 Current            2 Term 23 38w1d 07:29 :41 2.779 kg M Vag-Spont EPI N ULICES   1 Term 21 39w3d  3.706 kg M CS-LTranv Spinal N ULICES     Past Medical History:   Diagnosis Date    Allergic      Past Surgical History:   Procedure Laterality Date     SECTION, LOW TRANSVERSE N/A 2021    Procedure:  SECTION;  Surgeon: Irene Louie MD;  Location: [facility] L&D OR;  Service: Obstetrics;  Laterality: N/A;    WISDOM TOOTH EXTRACTION         Family History   Problem Relation Age of Onset    Cancer Maternal Grandfather     Diabetes Paternal Grandmother     Rheum arthritis Neg Hx     Osteoarthritis Neg Hx     Asthma Neg Hx     Breast cancer Neg Hx     Heart failure Neg Hx     Hyperlipidemia Neg Hx     Hypertension Neg Hx     Migraines Neg Hx     Ovarian cancer Neg Hx     Seizures Neg Hx     Rashes / Skin problems Neg Hx     Stroke Neg Hx     Thyroid disease Neg Hx     Bladder Cancer Neg Hx      No Known Allergies  Medications Prior to Admission   Medication Sig    MVP-ferrous fumarate-FA (NATAFORT) 28 mg iron- 800 mcg tablet Take 1 tablet by mouth daily    docusate sodium (COLACE) 100 mg capsule Take 1 capsule (100 mg total) by mouth 2 (two) times a day for 10 days       Review of Systems:  See HPI    Objective     Vital signs:  Vitals:    24   BP: 111/74   Temp:        Prenatal Results    The patient does not have a working ROSANGELA. Some  results will not display without a working ROSANGELA.   Maternal Prenatal Labs       Test Value Date Time    Gonorrhea        Chlamydia        Syphilis Ab       RPR        Rubella       HBsAg        Hep C Ab       HIV       GBS                 Legend    ^: Historical                          Exam:  Gen: A&O, moderate distress with contractions, sitting up for epidural  HEENT: NC/AT  Lungs: non-labored respirations  Abdomen: gravid, NTTP      Presentation: Vertex  Cervical Consistency: Soft  Dilation: 6 (6-7)  Effacement (%): 90  Fetal Station: -1  Cervical Consistency: Soft  Position: Unknown    FHT: 140 baseline, mod jesse, pos accels, variable decels; no ominous decels  TOCO: q3 mins    Assessment/Plan      Principal Problem:    Uterine contractions  Active Problems:    History of     - admit patient  - PNC for GBS ppx  - will augment as indicated   - pt desires epidural  - CEFM  - plan for , patient verbalizes aware of risks of TOLAC  - B+/RI    _____________________________  Franky Medina MD  24 9:37 PM

## 2024-11-05 NOTE — NURSING END OF SHIFT
Nursing End of Shift Summary     Goals:  Clinical Goals for the Shift: progression of labor    Narrative Summary of Progress Towards Clinical Goals:   of infant male    Barriers for Transfer or Discharge: yes  Recovery incomplete.    SBAR to THAO Hager RN to assume care.

## 2024-11-05 NOTE — NURSING NOTE
"Patient calls RN to room stating that over the last hour or so she has developed chills and generalized body aches. She says \"I just don't feed that well.\" She reports no respiratory type symptoms, no changes in lochia / discharge, and no increased uterine or lower abdominal pain. Vitals signs taken. Temporal thermometer read 37.1. Patient just took a sip of ice water. Instructed patient to not drink anything for next 20 minutes and RN will come back and check an oral temperature.  "

## 2024-11-05 NOTE — NURSING NOTE
"Patient reports feeling about the same, not worse or better. She asks \"I got that MMR vaccine earlier today, could that be causing me to have some chills and body aches?\" RN agrees that this is a possibility. For now plan is to continue to monitor vitals / temperature per routine. Patient to notify RN of any changes or worsening symptoms.  "

## 2024-11-05 NOTE — L&D DELIVERY NOTE
OB Vaginal Delivery Note    Date: 2024  Location: [facility]  Name: Magy Marin  Age: 29 y.o.  Attending Physician: Franky Medina MD    Surgeon: Franky Medina MD    Review the Delivery Report for details.     GA: 39w5d    Diagnosis: labor, history of  delivery x1    GP:     Labor Complications:  none       Estimated Blood Loss: 400cc    Delivery Type: Vaginal, Spontaneous    ROM to Delivery Time: 0h 19m     Sex: Male        Weight:       1 Minute 5 Minute 10 Minute 15 Minute 20 Minute   Apgar Totals: 9   9                Delivery Details:  The patient presented in labor. She received an epidural. She felt a lot of pressure and she was complete with a bulging membrane. AROM for clear fluid. Patient then pushed. The vertex was delivered in a well controlled fashion followed by the remainder of the infant. The baby was laid on the maternal abdomen. The cord was clamped X 2 and cut after one minute. Cord blood was obtained. 10 units of IM pitocin were administered. The placenta delivered intact. She had a left and right first degree laceration along the internal vagina, intact perineum. She had a L labial laceration. The lacerations were repaired with 2-0 and 3-0 chromic. The patient recovered in labor and delivery with her infant.     ________________________________________  Franky Medina MD  24 11:26 PM

## 2024-11-05 NOTE — PLAN OF CARE
Problem: Postpartum  Goal: Experiences normal postpartum course  Description: INTERVENTIONS:  1. Monitor maternal vital signs  2. Assess uterine involution  Outcome: Progressing  Flowsheets (Taken 2024)  Experiences normal postpartum course:   Monitor maternal vital signs   Assess uterine involution  Goal: Appropriate maternal -  bonding  Description: INTERVENTIONS:  1. Identify family support  2. Referral to  or  as needed  Outcome: Progressing  Flowsheets (Taken 2024 015)  Appropriate maternal- bonding: Identify family support  Goal: Establishment of infant feeding pattern  Description: INTERVENTIONS:  1. Assess breast/bottle feeding  2. Refer to lactation as needed  Outcome: Progressing  Flowsheets (Taken 2024 015)  Establishment of infant feeding pattern:   Assess breast/bottle feeding   Refer to lactation as needed  Goal: Incisions, wounds, or drain sites healing without S/S of infection  Description: INTERVENTIONS  1. Assess and document risk factors for skin breakdown  2. Assess and document skin integrity  3. Assess and document dressing/incision, wound bed, drain sites and surrounding tissue  4. Implement wound care per orders  Outcome: Progressing  Flowsheets (Taken 2024)  Incision(s), wound(s) or drain site(s) healing without S/S of infection: Assess and document skin integrity     Problem: Pain - Adult  Goal: Verbalizes/displays adequate comfort level or baseline comfort level  Description: INTERVENTIONS:  1. Encourage patient to monitor pain and request interventions  2. Assess pain using the appropriate pain scale  3. Administer analgesics based on type and severity of pain and evaluate response  4. Educate/Implement non-pharmacological measures as appropriate and evaluate response  5. Consider cultural, developmental and social influences on pain and pain management  6. Notify Provider if interventions unsuccessful or patient  reports new pain  Outcome: Progressing  Flowsheets (Taken 11/5/2024 0150)  Verbalizes/displays adequate comfort level or baseline comfort level:   Encourage patient to monitor pain and request interventions   Assess pain using the appropriate pain scale   Administer analgesics based on type and severity of pain and evaluate response   Educate/Implement non-pharmacological measures as appropriate and evaluate response   Consider cultural, developmental and social influences on pain and pain management   Notify Provider if interventions unsuccessful or patient reports new pain     Problem: Infection - Adult  Goal: Absence of infection during hospitalization  Description: INTERVENTIONS:  1. Assess and monitor for signs and symptoms of infection  2. Monitor lab/diagnostic results  3. Monitor all insertion sites/wounds/incisions  4. Monitor secretions for changes in amount and color  5. Administer medications as ordered  6. Educate and encourage patient and family to use good hand hygiene technique  7. Identify and educate in appropriate isolation precautions for identified infection/condition  Outcome: Progressing  Flowsheets (Taken 11/5/2024 0150)  Absence of infection during hospitalization:   Assess and monitor for signs and symptoms of infection   Monitor lab/diagnostic results   Monitor secretions for changes in amount and color   Administer medications as ordered     Problem: Safety Adult - Fall  Goal: Free from fall injury  Description: INTERVENTIONS:    Inpatient - Please reference Cares/Safety Flowsheet under [place] Fall Risk for interventions.  Pediatrics - Please reference Peds Daily Cares/Safety Flowsheet under Fay Pediatric Fall Assessment Fall Bundle for interventions  LD/OB - Please reference OB Shift Screening Flowsheet under OB Fall Risk for interventions.  Outcome: Progressing     Problem: Discharge Planning  Goal: Discharge to home or other facility with appropriate resources  Description:  INTERVENTIONS:  1. Identify and discuss barriers to discharge with patient and caregiver.  2. Arrange for needed discharge resources and transportation as appropriate.  3. Identify discharge learning needs (meds, wound care, etc).  4. Arrange for interpreters to assist at discharge as needed.  5. Refer to  for coordinating discharge planning if the patient needs post-hospital services based on physician order or complex needs related to functional status, cognitive ability or social support system.  Outcome: Progressing  Flowsheets (Taken 11/5/2024 0150)  Discharge to home or other facility with appropriate resources: Identify and discuss barriers to discharge with patient and caregiver

## 2024-11-06 VITALS
SYSTOLIC BLOOD PRESSURE: 105 MMHG | DIASTOLIC BLOOD PRESSURE: 63 MMHG | OXYGEN SATURATION: 99 % | HEIGHT: 64 IN | RESPIRATION RATE: 18 BRPM | HEART RATE: 92 BPM | BODY MASS INDEX: 26.84 KG/M2 | WEIGHT: 157.19 LBS | TEMPERATURE: 96.8 F

## 2024-11-06 PROCEDURE — 2590000100 HC RX 259: Performed by: STUDENT IN AN ORGANIZED HEALTH CARE EDUCATION/TRAINING PROGRAM

## 2024-11-06 PROCEDURE — 6370000100 HC RX 637 (ALT 250 FOR IP): Performed by: STUDENT IN AN ORGANIZED HEALTH CARE EDUCATION/TRAINING PROGRAM

## 2024-11-06 RX ORDER — IBUPROFEN 800 MG/1
800 TABLET ORAL EVERY 8 HOURS
Qty: 40 TABLET | Refills: 0 | Status: SHIPPED | OUTPATIENT
Start: 2024-11-06 | End: 2024-11-16

## 2024-11-06 RX ADMIN — Medication 1 TABLET: at 08:30

## 2024-11-06 RX ADMIN — DOCUSATE SODIUM 100 MG: 100 CAPSULE, LIQUID FILLED ORAL at 08:30

## 2024-11-06 RX ADMIN — IBUPROFEN 800 MG: 800 TABLET, FILM COATED ORAL at 00:16

## 2024-11-06 RX ADMIN — IBUPROFEN 800 MG: 800 TABLET, FILM COATED ORAL at 08:30

## 2024-11-06 NOTE — LACTATION NOTE
This note was copied from a baby's chart.  Consult done related to: Maternal request for reassurance with latching. Mother notes infant is latching well and with no pain.     : 2024  Gestational Age: 39w5d AGA infant.     Mother's Name: Magy Marin    Type of delivery: Vaginal, Spontaneous    Birth weight 7 lb 0.3 oz (3183 g)   Weight change since birth Pct Wt Change: -4.31 %   Current/Previous Weight Patient Weights for the past 8760 hrs (Last 2 readings):   Weight   24 2300 3046 g   24 2251 3183 g      Weight Change  Since Last Visit: -137 g     Mother reports infant is feeding well with a comfortable, sustained latch. General breastfeeding education provided. Informed of outpatient Lactation Consultant Services and encouraged to call PRN.     Maternal history includes:  29 y.o.    Patient Active Problem List    Diagnosis Date Noted    Uterine contractions 2024    History of  2024    , delivered 2023     delivery delivered 2021    Breech presentation 2021     Past Medical History:   Diagnosis Date    Allergic       Past Surgical History:   Procedure Laterality Date     SECTION, LOW TRANSVERSE N/A 2021    Procedure:  SECTION;  Surgeon: Irene Louie MD;  Location: [facility] L&D OR;  Service: Obstetrics;  Laterality: N/A;    WISDOM TOOTH EXTRACTION       Social History     Tobacco Use    Smoking status: Never    Smokeless tobacco: Never   Vaping Use    Vaping status: Never Used   Substance Use Topics    Alcohol use: Not Currently    Drug use: Never     ----------------------------------------  Karly Quinn RN  24 12:13 PM

## 2024-11-06 NOTE — DISCHARGE SUMMARY
Postpartum Progress Note    Date of Service: 24    Subjective   Patient is doing well today. Lochia is minimal. Patient is tolerating a general diet. She is ambulating. Pain is well controlled. Patient is passing flatus and has not had a BM. Patient is urinating without difficulty. She is breast feeding well.     Objective   Vitals:    24 0732   BP: 105/63   Pulse: 92   Resp: 18   Temp: 36 °C (96.8 °F)   SpO2: 99%     Physical Exam    General:  Alert, cooperative, no distress   Abdomen: Bowel sounds present. Soft/appropriate tenderness/non-distended. Fundus firm below umbilicus. Incision: not applicable, (vaginal delivery).    Extremities: No calf tenderness     Labs  No results found for this or any previous visit (from the past 24 hour(s)).    Assessment/Plan     Magy Marin is a 29 y.o.  postpartum day 2 s/p Vaginal, Spontaneous delivery.     1. Routine postpartum care  2. Method of Contraception: nothing  3. Disposition: stable for [place] home   ______________________________  Layla Roman MD  24 1:23 PM

## 2024-11-06 NOTE — PLAN OF CARE
Problem: Postpartum  Goal: Experiences normal postpartum course  Description: INTERVENTIONS:  1. Monitor maternal vital signs  2. Assess uterine involution  Outcome: Progressing  Flowsheets (Taken 2024 by Kylah Hager, RN)  Experiences normal postpartum course:   Monitor maternal vital signs   Assess uterine involution  Goal: Appropriate maternal -  bonding  Description: INTERVENTIONS:  1. Identify family support  2. Referral to  or  as needed  Outcome: Progressing  Flowsheets (Taken 2024 by Kylah Hager, RN)  Appropriate maternal- bonding: Identify family support  Goal: Establishment of infant feeding pattern  Description: INTERVENTIONS:  1. Assess breast/bottle feeding  2. Refer to lactation as needed  Outcome: Progressing  Flowsheets (Taken 2024 by Kylah Hager, RN)  Establishment of infant feeding pattern:   Refer to lactation as needed   Assess breast/bottle feeding  Goal: Incisions, wounds, or drain sites healing without S/S of infection  Description: INTERVENTIONS  1. Assess and document risk factors for skin breakdown  2. Assess and document skin integrity  3. Assess and document dressing/incision, wound bed, drain sites and surrounding tissue  4. Implement wound care per orders  Outcome: Progressing  Flowsheets (Taken 2024 by Kylah Hager, RN)  Incision(s), wound(s) or drain site(s) healing without S/S of infection: Assess and document skin integrity     Problem: Pain - Adult  Goal: Verbalizes/displays adequate comfort level or baseline comfort level  Description: INTERVENTIONS:  1. Encourage patient to monitor pain and request interventions  2. Assess pain using the appropriate pain scale  3. Administer analgesics based on type and severity of pain and evaluate response  4. Educate/Implement non-pharmacological measures as appropriate and evaluate response  5. Consider cultural, developmental and social  influences on pain and pain management  6. Notify Provider if interventions unsuccessful or patient reports new pain  Outcome: Progressing  Flowsheets (Taken 11/5/2024 2118 by Kylah Hager, RN)  Verbalizes/displays adequate comfort level or baseline comfort level:   Encourage patient to monitor pain and request interventions   Assess pain using the appropriate pain scale   Administer analgesics based on type and severity of pain and evaluate response   Educate/Implement non-pharmacological measures as appropriate and evaluate response   Consider cultural, developmental and social influences on pain and pain management   Notify Provider if interventions unsuccessful or patient reports new pain     Problem: Infection - Adult  Goal: Absence of infection during hospitalization  Description: INTERVENTIONS:  1. Assess and monitor for signs and symptoms of infection  2. Monitor lab/diagnostic results  3. Monitor all insertion sites/wounds/incisions  4. Monitor secretions for changes in amount and color  5. Administer medications as ordered  6. Educate and encourage patient and family to use good hand hygiene technique  7. Identify and educate in appropriate isolation precautions for identified infection/condition  Outcome: Progressing  Flowsheets (Taken 11/5/2024 2118 by Kylah Hager RN)  Absence of infection during hospitalization:   Assess and monitor for signs and symptoms of infection   Monitor lab/diagnostic results   Monitor secretions for changes in amount and color   Administer medications as ordered     Problem: Safety Adult - Fall  Goal: Free from fall injury  Description: INTERVENTIONS:    Inpatient - Please reference Cares/Safety Flowsheet under [place] Fall Risk for interventions.  Pediatrics - Please reference Peds Daily Cares/Safety Flowsheet under [facility] Pediatric Fall Assessment Fall Bundle for interventions  LD/OB - Please reference OB Shift Screening Flowsheet under OB Fall Risk for  interventions.  Outcome: Progressing     Problem: Discharge Planning  Goal: Discharge to home or other facility with appropriate resources  Description: INTERVENTIONS:  1. Identify and discuss barriers to discharge with patient and caregiver.  2. Arrange for needed discharge resources and transportation as appropriate.  3. Identify discharge learning needs (meds, wound care, etc).  4. Arrange for interpreters to assist at discharge as needed.  5. Refer to  for coordinating discharge planning if the patient needs post-hospital services based on physician order or complex needs related to functional status, cognitive ability or social support system.  Outcome: Progressing  Flowsheets (Taken 11/5/2024 2118 by Kylah Hager, RN)  Discharge to home or other facility with appropriate resources: Identify and discuss barriers to discharge with patient and caregiver

## 2024-11-06 NOTE — PLAN OF CARE
Problem: Postpartum  Goal: Experiences normal postpartum course  Description: INTERVENTIONS:  1. Monitor maternal vital signs  2. Assess uterine involution  Outcome: Progressing  Flowsheets (Taken 2024)  Experiences normal postpartum course:   Monitor maternal vital signs   Assess uterine involution  Goal: Appropriate maternal -  bonding  Description: INTERVENTIONS:  1. Identify family support  2. Referral to  or  as needed  Outcome: Progressing  Flowsheets (Taken 2024)  Appropriate maternal- bonding: Identify family support  Goal: Establishment of infant feeding pattern  Description: INTERVENTIONS:  1. Assess breast/bottle feeding  2. Refer to lactation as needed  Outcome: Progressing  Flowsheets (Taken 2024)  Establishment of infant feeding pattern:   Refer to lactation as needed   Assess breast/bottle feeding  Goal: Incisions, wounds, or drain sites healing without S/S of infection  Description: INTERVENTIONS  1. Assess and document risk factors for skin breakdown  2. Assess and document skin integrity  3. Assess and document dressing/incision, wound bed, drain sites and surrounding tissue  4. Implement wound care per orders  Outcome: Progressing  Flowsheets (Taken 2024)  Incision(s), wound(s) or drain site(s) healing without S/S of infection: Assess and document skin integrity     Problem: Pain - Adult  Goal: Verbalizes/displays adequate comfort level or baseline comfort level  Description: INTERVENTIONS:  1. Encourage patient to monitor pain and request interventions  2. Assess pain using the appropriate pain scale  3. Administer analgesics based on type and severity of pain and evaluate response  4. Educate/Implement non-pharmacological measures as appropriate and evaluate response  5. Consider cultural, developmental and social influences on pain and pain management  6. Notify Provider if interventions unsuccessful or patient  reports new pain  Outcome: Progressing  Flowsheets (Taken 11/5/2024 2118)  Verbalizes/displays adequate comfort level or baseline comfort level:   Encourage patient to monitor pain and request interventions   Assess pain using the appropriate pain scale   Administer analgesics based on type and severity of pain and evaluate response   Educate/Implement non-pharmacological measures as appropriate and evaluate response   Consider cultural, developmental and social influences on pain and pain management   Notify Provider if interventions unsuccessful or patient reports new pain     Problem: Infection - Adult  Goal: Absence of infection during hospitalization  Description: INTERVENTIONS:  1. Assess and monitor for signs and symptoms of infection  2. Monitor lab/diagnostic results  3. Monitor all insertion sites/wounds/incisions  4. Monitor secretions for changes in amount and color  5. Administer medications as ordered  6. Educate and encourage patient and family to use good hand hygiene technique  7. Identify and educate in appropriate isolation precautions for identified infection/condition  Outcome: Progressing  Flowsheets (Taken 11/5/2024 2118)  Absence of infection during hospitalization:   Assess and monitor for signs and symptoms of infection   Monitor lab/diagnostic results   Monitor secretions for changes in amount and color   Administer medications as ordered     Problem: Safety Adult - Fall  Goal: Free from fall injury  Description: INTERVENTIONS:    Inpatient - Please reference Cares/Safety Flowsheet under [place] Fall Risk for interventions.  Pediatrics - Please reference Peds Daily Cares/Safety Flowsheet under [facility] Pediatric Fall Assessment Fall Bundle for interventions  LD/OB - Please reference OB Shift Screening Flowsheet under OB Fall Risk for interventions.  Outcome: Progressing     Problem: Discharge Planning  Goal: Discharge to home or other facility with appropriate resources  Description:  INTERVENTIONS:  1. Identify and discuss barriers to discharge with patient and caregiver.  2. Arrange for needed discharge resources and transportation as appropriate.  3. Identify discharge learning needs (meds, wound care, etc).  4. Arrange for interpreters to assist at discharge as needed.  5. Refer to  for coordinating discharge planning if the patient needs post-hospital services based on physician order or complex needs related to functional status, cognitive ability or social support system.  Outcome: Progressing  Flowsheets (Taken 11/5/2024 2118)  Discharge to home or other facility with appropriate resources: Identify and discuss barriers to discharge with patient and caregiver

## 2024-11-06 NOTE — DISCHARGE INSTRUCTIONS
Postpartum Discharge Instructions    Breast-Feeding  Feed your baby on demand.  Breastfeeding every 1 ½ -3 hours is normal. You create more milk by breastfeeding more often  Drink water when thirsty.  Eat a balanced diet.  Most things you eat and drink enter the breast milk including caffeine, alcohol, and medicines.  The most common cause of pain with breastfeeding is because the baby is not latched on right.  See the [facility] Book, for more information.  Call [facility] services [phone] if you have any breastfeeding questions or concerns.  Breast-milk may be stored in a refrigerator for up to 3 days and in a freezer for 3-6 months.  Do not use a microwave to thaw frozen milk.  Using warm water for thawing is the best way.  Call your provider if you have red, tender areas on your breast along with a fever.  Refer to pages 35-45 in [facility]    Bottle-Feeding  Breast Care:  Wear a comfortable, supportive bra.  Use pain medicines (Acetaminophen) as needed to relieve swelling and pressure.  Ice packs may be put on the breasts 4 times a day for 15 minutes each time for comfort. Avoid breast stimulation such as hot showers directly on the breasts or expressing milk.  Rest and relax when the baby sleeps, at least 2 times a day  Do not heat formula in the microwave ---it may cause severe burns  Feed the baby on demand.   Hold the baby in your arms when feeding the baby.  After every ½ to 1 ounce or more the baby drinks, burp the baby.  Never leave the baby with a propped bottle.  If your baby does not finish the whole bottle, throw it away the unused formula.    Episiotomy/Perineal Care  Watch for increased pain, episiotomy drainage, and stitch dislodgement.  Soak in a warm bathtub 2-3 times a day for discomfort.  Wash your hands well at all times.  Wipe the perineal area from front to back to prevent infection.  No tampons, swimming, hot tubs, or douching for 4-6 weeks.  Use  medications as directed for constipation.  See page 9 of Celebrating [facility]    Sexual Activity  No sex for 6 weeks  When you resume sex, use water-soluble jelly (K-Y) or contraceptive cream if you notice a need for more vaginal lubrication  Breast-feeding is not a form of birth control.  When you resume sex, foams and condoms are a good form of birth control and prevent sexually transmitted diseases, when used right.  Follow your providers suggestions for birth control.  Refer to page 11 in [facility]    Diet  Eat a well-balanced diet; choose from all food groups for each meal (milk or dairy, meat, fruits, vegetables, grains.)  Refer to page 13 in [facility]ating [facility] Family    When to call your provider  Heavy or bright red bleeding (more than 1 pad per hour) not related to increased activity.  Change in bleeding back to dark red clots.  Bad smell to the vaginal discharge  If you have pain, burning, trouble, or frequency with urination  Lower back pain  Chills, fever above 100.4 degrees F, Feeling like you have the flu, or increased stomach tenderness or pain  If you experience a headache that does not get better, visual changes, shortness of breath, or swelling that increases after you are discharged  Pain, redness, swelling, increased warmth in calf or any part of the leg   Sadness or blues lasting longer than 2 weeks or thoughts of hurting yourself or the baby (Page 14-15 in [facility] Family)  Maternal Mental Health Hotline. The hotline is accessible by phone or text at 1-714-4-KOQP2KCSJ (1-710.127.5512) in English or [place]  Refer to page 6-7 and page 48 in [facility]ating [facility] Family

## (undated) DEVICE — SUTURE VICRYL 4-0 FS-2 27

## (undated) DEVICE — KIT OB SUCTION S KIWI PROCUP @ FETAL DELIVERY DEVICE

## (undated) DEVICE — DRESSING TELFA 8X3

## (undated) DEVICE — TUBING OXYGEN 7' AIRLIFE @ W/CRUSH RESISTANT LUMEN

## (undated) DEVICE — DRESSING MEPILEX BORDER 4X12 FOAM POST OP

## (undated) DEVICE — KIT OB SUCTION M KIWI OMNICUP

## (undated) DEVICE — SUTURE VICRYL 0 CT-1 36" VIOLET

## (undated) DEVICE — TRAY FOLEY CATH 16F 350ML URINE METER

## (undated) DEVICE — GLOVE SENSICARE SLT 7.0 SURG

## (undated) DEVICE — ELECTRODE DUAL DISPERSIVE 10' CABLE THERMOGARD PLUS ABC

## (undated) DEVICE — MANIFOLD ULTRA DORNOCH

## (undated) DEVICE — SOL SOD CHL IRR .9% 1000ML BTL USP PLASTIC POUR BOTTLE

## (undated) DEVICE — CLOSURE SKIN STERISTRIP 1/2" 3M

## (undated) DEVICE — GLOVE SENSICARE SLT 6.5 SURG

## (undated) DEVICE — TRAY C SECTON CUSTOM SPRH CUSTOM PACK

## (undated) DEVICE — DRESSING ISLAND 4X14" SURGICAL TELFA

## (undated) DEVICE — BENZOIN TINCTURE COMPOUND

## (undated) DEVICE — SYRINGE 3CC NO NEEDLE

## (undated) DEVICE — PREP SKIN CHLORAPREP CLR 26ML

## (undated) DEVICE — MAT SURGISAFE WHITE 28" X 72" ABSORBENT FLOOR

## (undated) DEVICE — DRESSING 8X10" ABD PAD CURITY STL

## (undated) DEVICE — SYRINGE EAR & ULCER 2 OZ NEONATAL

## (undated) DEVICE — SUTURE PLAIN GUT 1 54" TIE

## (undated) DEVICE — SUTURE MONOCRYL 4-0 PS-2 18" UNDYED

## (undated) DEVICE — CLAMP UMBILICAL CORD @ @ - NSTL